# Patient Record
Sex: MALE | Race: WHITE | NOT HISPANIC OR LATINO | Employment: FULL TIME | URBAN - NONMETROPOLITAN AREA
[De-identification: names, ages, dates, MRNs, and addresses within clinical notes are randomized per-mention and may not be internally consistent; named-entity substitution may affect disease eponyms.]

---

## 2023-03-16 ENCOUNTER — OFFICE VISIT (OUTPATIENT)
Dept: INTERNAL MEDICINE | Facility: CLINIC | Age: 53
End: 2023-03-16
Payer: COMMERCIAL

## 2023-03-16 VITALS
HEART RATE: 96 BPM | RESPIRATION RATE: 18 BRPM | DIASTOLIC BLOOD PRESSURE: 75 MMHG | TEMPERATURE: 98 F | WEIGHT: 199.4 LBS | HEIGHT: 72 IN | BODY MASS INDEX: 27.01 KG/M2 | SYSTOLIC BLOOD PRESSURE: 138 MMHG | OXYGEN SATURATION: 97 %

## 2023-03-16 DIAGNOSIS — E11.649 TYPE 2 DIABETES MELLITUS WITH HYPOGLYCEMIA WITHOUT COMA, WITH LONG-TERM CURRENT USE OF INSULIN: Primary | ICD-10-CM

## 2023-03-16 DIAGNOSIS — R53.82 CHRONIC FATIGUE: ICD-10-CM

## 2023-03-16 DIAGNOSIS — Z00.00 ENCOUNTER FOR MEDICAL EXAMINATION TO ESTABLISH CARE: ICD-10-CM

## 2023-03-16 DIAGNOSIS — Z79.4 TYPE 2 DIABETES MELLITUS WITH HYPOGLYCEMIA WITHOUT COMA, WITH LONG-TERM CURRENT USE OF INSULIN: Primary | ICD-10-CM

## 2023-03-16 DIAGNOSIS — E53.8 VITAMIN B12 DEFICIENCY: ICD-10-CM

## 2023-03-16 DIAGNOSIS — Z11.59 ENCOUNTER FOR HEPATITIS C SCREENING TEST FOR LOW RISK PATIENT: ICD-10-CM

## 2023-03-16 DIAGNOSIS — E55.9 VITAMIN D DEFICIENCY: ICD-10-CM

## 2023-03-16 DIAGNOSIS — I10 HYPERTENSION, UNSPECIFIED TYPE: ICD-10-CM

## 2023-03-16 DIAGNOSIS — Z13.220 SCREENING FOR HYPERLIPIDEMIA: ICD-10-CM

## 2023-03-16 LAB
EXPIRATION DATE: ABNORMAL
HBA1C MFR BLD: 8 %
Lab: ABNORMAL

## 2023-03-16 PROCEDURE — 99204 OFFICE O/P NEW MOD 45 MIN: CPT

## 2023-03-16 PROCEDURE — 83036 HEMOGLOBIN GLYCOSYLATED A1C: CPT

## 2023-03-16 RX ORDER — PROCHLORPERAZINE 25 MG/1
1 SUPPOSITORY RECTAL DAILY
Qty: 6 EACH | Refills: 3 | Status: SHIPPED | OUTPATIENT
Start: 2023-03-16

## 2023-03-16 RX ORDER — INSULIN LISPRO 100 [IU]/ML
3 INJECTION, SOLUTION INTRAVENOUS; SUBCUTANEOUS 3 TIMES DAILY
COMMUNITY
End: 2023-03-16 | Stop reason: SDUPTHER

## 2023-03-16 RX ORDER — LISINOPRIL 20 MG/1
20 TABLET ORAL DAILY
COMMUNITY
End: 2023-03-16 | Stop reason: SDUPTHER

## 2023-03-16 RX ORDER — AMLODIPINE BESYLATE 2.5 MG/1
2.5 TABLET ORAL DAILY
COMMUNITY
End: 2023-03-16 | Stop reason: SDUPTHER

## 2023-03-16 RX ORDER — AMLODIPINE BESYLATE 2.5 MG/1
2.5 TABLET ORAL DAILY
Qty: 90 TABLET | Refills: 1 | Status: SHIPPED | OUTPATIENT
Start: 2023-03-16 | End: 2023-03-22 | Stop reason: SDUPTHER

## 2023-03-16 RX ORDER — PROCHLORPERAZINE 25 MG/1
SUPPOSITORY RECTAL
COMMUNITY
End: 2023-03-16 | Stop reason: SDUPTHER

## 2023-03-16 RX ORDER — INSULIN LISPRO 100 [IU]/ML
8 INJECTION, SOLUTION INTRAVENOUS; SUBCUTANEOUS 3 TIMES DAILY
Qty: 12 ML | Refills: 1 | Status: SHIPPED | OUTPATIENT
Start: 2023-03-16

## 2023-03-16 RX ORDER — BLOOD-GLUCOSE SENSOR
EACH MISCELLANEOUS
COMMUNITY
End: 2023-03-16 | Stop reason: SDUPTHER

## 2023-03-16 RX ORDER — LISINOPRIL 20 MG/1
20 TABLET ORAL DAILY
Qty: 90 TABLET | Refills: 1 | Status: SHIPPED | OUTPATIENT
Start: 2023-03-16 | End: 2023-03-22 | Stop reason: SDUPTHER

## 2023-03-16 RX ORDER — BLOOD SUGAR DIAGNOSTIC
STRIP MISCELLANEOUS
COMMUNITY

## 2023-03-16 RX ORDER — BLOOD-GLUCOSE SENSOR
1 EACH MISCELLANEOUS
Qty: 6 EACH | Refills: 3 | Status: SHIPPED | OUTPATIENT
Start: 2023-03-16 | End: 2023-03-22

## 2023-03-16 NOTE — PROGRESS NOTES
Subjective     Chief Complaint   Patient presents with   • Diabetes     Establishing care.        History of Present Illness  Patient is a 53-year-old male who presents today to establish care.  Has known past medical history of type 2 diabetes mellitus, hypertension, and arthritis. Has been diabetic for 17 years. Does forget to take fast acting insulin 30 minutes prior to meals.  Recently moved to this area, states had previously lived here and then moved off her work, and has not returned.  Is needing a primary care provider in this area.  He is currently on amlodipine 2.5 mg daily along with lisinopril 20 mg daily for hypertension management.  Is currently on metformin, glargine, and Humalog.  He does have a Dexcom in which he is able to monitor his glucose closely.  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     States he works 3rd shift at [employer] so he is just getting off work, so does not surprise him BP is slightly elevated. Admits BP normally runs 124/80's. Is due to take medications when he gets home.       Patient's PMR from outside medical facility reviewed and noted.    Review of Systems   Constitutional: Negative for activity change, chills, fatigue and unexpected weight change.   HENT: Negative for congestion, ear pain, mouth sores and trouble swallowing.    Eyes: Negative for discharge and visual disturbance.   Respiratory: Negative for cough and shortness of breath.    Cardiovascular: Negative for chest pain and leg swelling.   Gastrointestinal: Negative for abdominal pain, constipation, diarrhea and nausea.   Genitourinary: Negative for decreased urine volume, difficulty urinating and hematuria.   Musculoskeletal: Negative for back pain, gait problem and myalgias.   Skin: Negative for color change and rash.   Allergic/Immunologic: Negative for environmental allergies and immunocompromised state.   Neurological: Negative for speech  difficulty, weakness and headaches.   Psychiatric/Behavioral: Negative for confusion and sleep disturbance. The patient is not nervous/anxious.         Otherwise complete ROS reviewed and negative except as mentioned in the HPI.    Past Medical History:   Past Medical History:   Diagnosis Date   • Arthritis    • Diabetes mellitus (HCC)    • Hypertension      Past Surgical History:  Past Surgical History:   Procedure Laterality Date   • KIDNEY SURGERY Left      Social History:  reports that he has never smoked. He has never used smokeless tobacco. He reports current alcohol use. He reports that he does not use drugs.    Family History: family history includes Alzheimer's disease in his father; Diabetes in his mother.      Allergies:  Allergies   Allergen Reactions   • Penicillins GI Intolerance     Medications:  Prior to Admission medications    Medication Sig Start Date End Date Taking? Authorizing Provider   amLODIPine (NORVASC) 2.5 MG tablet Take 1 tablet by mouth Daily.   Yes Althea Tang MD   Continuous Blood Gluc Sensor (Dexcom G7 Sensor) misc    Yes Althea Tang MD   Continuous Blood Gluc Transmit (Dexcom G6 Transmitter) misc    Yes Althea Tang MD   Insulin Glargine, 1 Unit Dial, (TOUJEO) 300 UNIT/ML solution pen-injector injection Inject  under the skin into the appropriate area as directed.   Yes Althea Tang MD   Insulin Lispro, 1 Unit Dial, (HumaLOG KwikPen) 100 UNIT/ML solution pen-injector Inject 300 Units under the skin into the appropriate area as directed.   Yes Althea Tang MD   Insulin Pen Needle (Pen Needles) 32G X 6 MM misc    Yes Althea Tang MD   lisinopril (PRINIVIL,ZESTRIL) 20 MG tablet Take 1 tablet by mouth Daily.   Yes Althea Tang MD   metFORMIN (GLUCOPHAGE) 500 MG tablet Take 1 tablet by mouth 3 (Three) Times a Day.   Yes Althea Tang MD       EVELYNE:        PHQ-9 Depression Screening  Little interest or pleasure in  "doing things? 0-->not at all   Feeling down, depressed, or hopeless? 0-->not at all   Trouble falling or staying asleep, or sleeping too much?     Feeling tired or having little energy?     Poor appetite or overeating?     Feeling bad about yourself - or that you are a failure or have let yourself or your family down?     Trouble concentrating on things, such as reading the newspaper or watching television?     Moving or speaking so slowly that other people could have noticed? Or the opposite - being so fidgety or restless that you have been moving around a lot more than usual?     Thoughts that you would be better off dead, or of hurting yourself in some way?     PHQ-9 Total Score 0   If you checked off any problems, how difficult have these problems made it for you to do your work, take care of things at home, or get along with other people?         PHQ-9 Total Score: 0   0 (Negative screening for depression)  Support given, observe for worsening symptoms    Objective     Vital Signs: /75 (BP Location: Right arm, Patient Position: Sitting, Cuff Size: Adult)   Pulse 96   Temp 98 °F (36.7 °C) (Skin)   Resp 18   Ht 182.9 cm (72\")   Wt 90.4 kg (199 lb 6.4 oz)   SpO2 97%   BMI 27.04 kg/m²   Physical Exam  Vitals and nursing note reviewed.   Constitutional:       General: He is not in acute distress.     Appearance: Normal appearance. He is normal weight. He is not ill-appearing.   HENT:      Head: Normocephalic and atraumatic.      Right Ear: External ear normal.      Left Ear: External ear normal.      Nose: Nose normal.      Mouth/Throat:      Mouth: Mucous membranes are moist.      Pharynx: No posterior oropharyngeal erythema.   Eyes:      General: No scleral icterus.     Extraocular Movements: Extraocular movements intact.      Conjunctiva/sclera: Conjunctivae normal.      Pupils: Pupils are equal, round, and reactive to light.   Cardiovascular:      Rate and Rhythm: Normal rate and regular rhythm.     "  Pulses: Normal pulses.      Heart sounds: Normal heart sounds.   Pulmonary:      Effort: Pulmonary effort is normal. No respiratory distress.      Breath sounds: Normal breath sounds. No wheezing.   Abdominal:      General: Abdomen is flat. Bowel sounds are normal.      Palpations: Abdomen is soft.      Tenderness: There is no abdominal tenderness.   Musculoskeletal:         General: Normal range of motion.      Cervical back: Normal range of motion.      Right lower leg: No edema.      Left lower leg: Edema present.   Lymphadenopathy:      Cervical: No cervical adenopathy.   Skin:     General: Skin is warm and dry.      Findings: No erythema or rash.   Neurological:      General: No focal deficit present.      Mental Status: He is alert and oriented to person, place, and time. Mental status is at baseline.      Motor: No weakness.   Psychiatric:         Mood and Affect: Mood normal.         Behavior: Behavior normal.         Thought Content: Thought content normal.         Judgment: Judgment normal.         BMI is >= 25 and <30. (Overweight) The following options were offered after discussion;: exercise counseling/recommendations and nutrition counseling/recommendations      Advance Care Planning   ACP discussion was declined by the patient. Patient does not have an advance directive, declines further assistance.         Results Reviewed:  Hemoglobin A1C   Date Value Ref Range Status   03/16/2023 8.0 % Final         Assessment / Plan     Assessment/Plan:  1. Type 2 diabetes mellitus with hypoglycemia without coma, with long-term current use of insulin ([facility])  - POC Glycosylated Hemoglobin (Hb A1C)  - Continuous Blood Gluc Sensor (Dexcom G7 Sensor) misc; 1 each Every 10 (Ten) Days.  Dispense: 6 each; Refill: 3  - Insulin Lispro, 1 Unit Dial, (HumaLOG KwikPen) 100 UNIT/ML solution pen-injector; Inject 8 Units under the skin into the appropriate area as directed 3 (Three) Times a Day. Patient utilizes sliding scale to  determine insulin administration  Dispense: 12 mL; Refill: 1  - metFORMIN (GLUCOPHAGE) 500 MG tablet; Take 1 tablet by mouth 3 (Three) Times a Day.  Dispense: 90 tablet; Refill: 3  - Continuous Blood Gluc Transmit (Dexcom G6 Transmitter) misc; 1 each Daily.  Dispense: 6 each; Refill: 3  - Insulin Glargine, 1 Unit Dial, (TOUJEO) 300 UNIT/ML solution pen-injector injection; Inject 49 Units under the skin into the appropriate area as directed Daily. 49-50 units once a day.  Dispense: 6 mL; Refill: 3    2. Hypertension, unspecified type  - lisinopril (PRINIVIL,ZESTRIL) 20 MG tablet; Take 1 tablet by mouth Daily.  Dispense: 90 tablet; Refill: 1  - amLODIPine (NORVASC) 2.5 MG tablet; Take 1 tablet by mouth Daily.  Dispense: 90 tablet; Refill: 1  - CBC w AUTO Differential  - Comprehensive metabolic panel    3. Encounter for medical examination to establish care  - CBC w AUTO Differential  - Comprehensive metabolic panel  - Lipid panel  - Vitamin D 25 hydroxy  - Vitamin B12  - T4  - TSH    4. Vitamin B12 deficiency  - Vitamin B12    5. Vitamin D deficiency  - Vitamin D 25 hydroxy    6. Chronic fatigue  - T4  - TSH    7. Screening for hyperlipidemia  - Lipid panel    8. Screening for hepatitis c   -hepatitis c antibody      A1c is 8 today. He states that is slightly higher than what he normally runs. Admits that he has been for    States last colonoscopy was approximately 1.5 years ago, states all was normal.     No follow-ups on file. unless patient needs to be seen sooner or acute issues arise.      I have discussed the patient results/orders and and plan/recommendation with them at today's visit.      July Johnson, APRN   03/16/2023

## 2023-03-17 LAB
25(OH)D3+25(OH)D2 SERPL-MCNC: 18.6 NG/ML (ref 30–100)
ALBUMIN SERPL-MCNC: 3.9 G/DL (ref 3.8–4.9)
ALBUMIN/GLOB SERPL: 1.8 {RATIO} (ref 1.2–2.2)
ALP SERPL-CCNC: 91 IU/L (ref 44–121)
ALT SERPL-CCNC: 28 IU/L (ref 0–44)
AST SERPL-CCNC: 20 IU/L (ref 0–40)
BASOPHILS # BLD AUTO: 0.1 X10E3/UL (ref 0–0.2)
BASOPHILS NFR BLD AUTO: 1 %
BILIRUB SERPL-MCNC: 0.3 MG/DL (ref 0–1.2)
BUN SERPL-MCNC: 21 MG/DL (ref 6–24)
BUN/CREAT SERPL: 22 (ref 9–20)
CALCIUM SERPL-MCNC: 8.7 MG/DL (ref 8.7–10.2)
CHLORIDE SERPL-SCNC: 101 MMOL/L (ref 96–106)
CHOLEST SERPL-MCNC: 158 MG/DL (ref 100–199)
CO2 SERPL-SCNC: 20 MMOL/L (ref 20–29)
CREAT SERPL-MCNC: 0.95 MG/DL (ref 0.76–1.27)
EGFRCR SERPLBLD CKD-EPI 2021: 96 ML/MIN/1.73
EOSINOPHIL # BLD AUTO: 0.3 X10E3/UL (ref 0–0.4)
EOSINOPHIL NFR BLD AUTO: 4 %
ERYTHROCYTE [DISTWIDTH] IN BLOOD BY AUTOMATED COUNT: 12.1 % (ref 11.6–15.4)
GLOBULIN SER CALC-MCNC: 2.2 G/DL (ref 1.5–4.5)
GLUCOSE SERPL-MCNC: 220 MG/DL (ref 70–99)
HCT VFR BLD AUTO: 42.4 % (ref 37.5–51)
HCV IGG SERPL QL IA: NON REACTIVE
HDLC SERPL-MCNC: 37 MG/DL
HGB BLD-MCNC: 14.2 G/DL (ref 13–17.7)
IMM GRANULOCYTES # BLD AUTO: 0.1 X10E3/UL (ref 0–0.1)
IMM GRANULOCYTES NFR BLD AUTO: 1 %
LDLC SERPL CALC-MCNC: 86 MG/DL (ref 0–99)
LYMPHOCYTES # BLD AUTO: 2 X10E3/UL (ref 0.7–3.1)
LYMPHOCYTES NFR BLD AUTO: 24 %
MCH RBC QN AUTO: 30.5 PG (ref 26.6–33)
MCHC RBC AUTO-ENTMCNC: 33.5 G/DL (ref 31.5–35.7)
MCV RBC AUTO: 91 FL (ref 79–97)
MONOCYTES # BLD AUTO: 0.6 X10E3/UL (ref 0.1–0.9)
MONOCYTES NFR BLD AUTO: 8 %
NEUTROPHILS # BLD AUTO: 5.2 X10E3/UL (ref 1.4–7)
NEUTROPHILS NFR BLD AUTO: 62 %
PLATELET # BLD AUTO: 381 X10E3/UL (ref 150–450)
POTASSIUM SERPL-SCNC: 5 MMOL/L (ref 3.5–5.2)
PROT SERPL-MCNC: 6.1 G/DL (ref 6–8.5)
RBC # BLD AUTO: 4.66 X10E6/UL (ref 4.14–5.8)
SODIUM SERPL-SCNC: 137 MMOL/L (ref 134–144)
T4 SERPL-MCNC: 7.3 UG/DL (ref 4.5–12)
TRIGL SERPL-MCNC: 203 MG/DL (ref 0–149)
TSH SERPL DL<=0.005 MIU/L-ACNC: 3.43 UIU/ML (ref 0.45–4.5)
VIT B12 SERPL-MCNC: 544 PG/ML (ref 232–1245)
VLDLC SERPL CALC-MCNC: 35 MG/DL (ref 5–40)
WBC # BLD AUTO: 8.3 X10E3/UL (ref 3.4–10.8)

## 2023-03-21 ENCOUNTER — TELEPHONE (OUTPATIENT)
Dept: INTERNAL MEDICINE | Facility: CLINIC | Age: 53
End: 2023-03-21
Payer: COMMERCIAL

## 2023-03-21 DIAGNOSIS — E55.9 VITAMIN D DEFICIENCY: Primary | ICD-10-CM

## 2023-03-21 DIAGNOSIS — E78.1 HIGH TRIGLYCERIDES: ICD-10-CM

## 2023-03-21 RX ORDER — CHLORAL HYDRATE 500 MG
2000 CAPSULE ORAL
Qty: 120 CAPSULE | Refills: 1 | Status: SHIPPED | OUTPATIENT
Start: 2023-03-21

## 2023-03-21 RX ORDER — ERGOCALCIFEROL 1.25 MG/1
50000 CAPSULE ORAL WEEKLY
Qty: 5 CAPSULE | Refills: 3 | Status: SHIPPED | OUTPATIENT
Start: 2023-03-21

## 2023-03-21 NOTE — TELEPHONE ENCOUNTER
Tempted to call patient discussed her labs, no answer left voicemail for him to return phone call to office.

## 2023-03-21 NOTE — PROGRESS NOTES
I attempted to call patient however was unsuccessful I did leave a voicemail for him to return phone call.Please let him know that his triglycerides are slightly elevated at 203, I would like him to decrease amount of saturated fat in his diet and look to choose leaner meats such as chicken and turkey.  I would also like him to start taking 2000 mg of omega-3 fish oil daily, have sent prescription to pharmacy.  His vitamin D is also low I will send replacement pill to the pharmacy if he is okay with that.

## 2023-03-22 ENCOUNTER — TELEPHONE (OUTPATIENT)
Dept: INTERNAL MEDICINE | Facility: CLINIC | Age: 53
End: 2023-03-22
Payer: COMMERCIAL

## 2023-03-22 DIAGNOSIS — I10 HYPERTENSION, UNSPECIFIED TYPE: ICD-10-CM

## 2023-03-22 RX ORDER — PROCHLORPERAZINE 25 MG/1
SUPPOSITORY RECTAL
Qty: 9 EACH | Refills: 3 | Status: SHIPPED | OUTPATIENT
Start: 2023-03-22

## 2023-03-22 RX ORDER — AMLODIPINE BESYLATE 2.5 MG/1
2.5 TABLET ORAL DAILY
Qty: 90 TABLET | Refills: 1 | Status: SHIPPED | OUTPATIENT
Start: 2023-03-22

## 2023-03-22 RX ORDER — LISINOPRIL 20 MG/1
20 TABLET ORAL DAILY
Qty: 90 TABLET | Refills: 1 | Status: SHIPPED | OUTPATIENT
Start: 2023-03-22

## 2023-03-22 NOTE — TELEPHONE ENCOUNTER
PATIENT CALLING BACK IN TO LET EDIE KNOW HE HAS A DEXCOM G 6 NOT G 7 PATIENT STATES HE SEEN THE MISTAKE IN THE ENCOUNTER AND WANTED TO CALL AND LET HER KNOW  IT IS THE G 6     GOOD CALL BACK   [phone]

## 2023-03-22 NOTE — TELEPHONE ENCOUNTER
Rx Refill Note  Requested Prescriptions     Pending Prescriptions Disp Refills   • lisinopril (PRINIVIL,ZESTRIL) 20 MG tablet 90 tablet 1     Sig: Take 1 tablet by mouth Daily.   • amLODIPine (NORVASC) 2.5 MG tablet 90 tablet 1     Sig: Take 1 tablet by mouth Daily.      Last office visit with prescribing clinician: 3/16/2023   Last telemedicine visit with prescribing clinician: 6/16/2023   Next office visit with prescribing clinician: 6/16/2023                         Would you like a call back once the refill request has been completed: [] Yes [] No    If the office needs to give you a call back, can they leave a voicemail: [] Yes [] No    Samia No MA  03/22/23, 09:28 CDT

## 2023-03-22 NOTE — TELEPHONE ENCOUNTER
Rx Refill Note  Requested Prescriptions     Pending Prescriptions Disp Refills   • Continuous Blood Gluc Sensor (Dexcom G6 Sensor) 9 each 3     Sig: Every 10 (Ten) Days.      Last office visit with prescribing clinician: 3/16/2023   Last telemedicine visit with prescribing clinician: 6/16/2023   Next office visit with prescribing clinician: 6/16/2023                         Would you like a call back once the refill request has been completed: [] Yes [] No    If the office needs to give you a call back, can they leave a voicemail: [] Yes [] No    Samia No MA  03/22/23, 15:59 CDT

## 2023-04-07 RX ORDER — SILDENAFIL CITRATE 20 MG/1
TABLET ORAL
COMMUNITY
Start: 2021-12-15 | End: 2023-04-07 | Stop reason: SDUPTHER

## 2023-04-07 RX ORDER — SILDENAFIL CITRATE 20 MG/1
20 TABLET ORAL DAILY
Qty: 30 TABLET | Refills: 0 | Status: SHIPPED | OUTPATIENT
Start: 2023-04-07

## 2023-04-07 RX ORDER — TRIAMCINOLONE ACETONIDE 1 MG/G
CREAM TOPICAL
COMMUNITY
Start: 2020-11-10

## 2023-04-07 NOTE — TELEPHONE ENCOUNTER
THIS WAS NOT ON PATIENT MED LIST, BUT HE STATED HE NEEDED A REFILL. ARE YOU OKAY WITH THIS?     Rx Refill Note  Requested Prescriptions     Pending Prescriptions Disp Refills   • sildenafil (REVATIO) 20 MG tablet        Last office visit with prescribing clinician: 3/16/2023  Next office visit with prescribing clinician: 6/16/2023                         Would you like a call back once the refill request has been completed: [] Yes [] No    If the office needs to give you a call back, can they leave a voicemail: [] Yes [] No    Kelley De La Cruz RN  04/07/23, 07:06 CDT

## 2023-04-07 NOTE — TELEPHONE ENCOUNTER
----- Message from Aydin Bonilla sent at 4/7/2023  7:02 AM CDT -----  Regarding: Two Medications   Contact: [phone]  When I had my first appointment I forgot a couple of prescription. I have added them to my list of medications in [facility] and I am needing a refill for the Sildenafil Citrate 20 MG. tablet.  Could you send a prescription to [facility] for a refill?    Thanks,  Aydin

## 2023-06-16 ENCOUNTER — OFFICE VISIT (OUTPATIENT)
Dept: INTERNAL MEDICINE | Facility: CLINIC | Age: 53
End: 2023-06-16
Payer: COMMERCIAL

## 2023-06-16 VITALS
HEART RATE: 80 BPM | WEIGHT: 197.3 LBS | DIASTOLIC BLOOD PRESSURE: 68 MMHG | RESPIRATION RATE: 17 BRPM | SYSTOLIC BLOOD PRESSURE: 130 MMHG | OXYGEN SATURATION: 97 % | HEIGHT: 72 IN | BODY MASS INDEX: 26.72 KG/M2 | TEMPERATURE: 97.7 F

## 2023-06-16 DIAGNOSIS — Z23 IMMUNIZATION DUE: ICD-10-CM

## 2023-06-16 DIAGNOSIS — E11.649 TYPE 2 DIABETES MELLITUS WITH HYPOGLYCEMIA WITHOUT COMA, WITH LONG-TERM CURRENT USE OF INSULIN: Primary | ICD-10-CM

## 2023-06-16 DIAGNOSIS — E55.9 VITAMIN D DEFICIENCY: ICD-10-CM

## 2023-06-16 DIAGNOSIS — E78.1 HIGH TRIGLYCERIDES: ICD-10-CM

## 2023-06-16 DIAGNOSIS — I10 HYPERTENSION, UNSPECIFIED TYPE: ICD-10-CM

## 2023-06-16 DIAGNOSIS — Z79.4 TYPE 2 DIABETES MELLITUS WITH HYPOGLYCEMIA WITHOUT COMA, WITH LONG-TERM CURRENT USE OF INSULIN: Primary | ICD-10-CM

## 2023-06-16 LAB
EXPIRATION DATE: ABNORMAL
HBA1C MFR BLD: 7.6 %
Lab: ABNORMAL

## 2023-06-16 RX ORDER — INSULIN LISPRO 100 [IU]/ML
8 INJECTION, SOLUTION INTRAVENOUS; SUBCUTANEOUS 3 TIMES DAILY
Qty: 12 ML | Refills: 1 | Status: SHIPPED | OUTPATIENT
Start: 2023-06-16

## 2023-06-16 RX ORDER — ERGOCALCIFEROL 1.25 MG/1
50000 CAPSULE ORAL WEEKLY
Qty: 5 CAPSULE | Refills: 3 | Status: SHIPPED | OUTPATIENT
Start: 2023-06-16

## 2023-06-16 RX ORDER — OMEPRAZOLE 20 MG/1
20 CAPSULE, DELAYED RELEASE ORAL DAILY
Qty: 60 CAPSULE | Refills: 0 | Status: SHIPPED | OUTPATIENT
Start: 2023-06-16

## 2023-06-16 RX ORDER — CHLORAL HYDRATE 500 MG
2000 CAPSULE ORAL
Qty: 120 CAPSULE | Refills: 1 | Status: SHIPPED | OUTPATIENT
Start: 2023-06-16

## 2023-06-16 NOTE — PROGRESS NOTES
"        Subjective     Chief Complaint   Patient presents with    Diabetes     Follow up.        History of Present Illness  Patient presents today for T2DM follow up. He is currently on insulin therapy. Takes fast acting insulin along with short acting (via sliding scale) glargine 50 units daily. He admits that he usually will administer 8-9 units if short acting with meals. Tolerating well. Also takes metformin 1500mg daily. Has a dexcom. Glucose runs around \"200\" or \"a little higher.\"  Patient has known history of hypertension with chronic management with norvasc 2.5mg daily and lisinopril 20mg daily. Tolerating medications well. Does not check BP at home.   Patient's PMR from outside medical facility reviewed and noted.    Review of Systems   Constitutional:  Negative for activity change, fatigue and unexpected weight change.   HENT:  Negative for mouth sores and trouble swallowing.    Eyes:  Negative for discharge and visual disturbance.   Respiratory:  Negative for cough and shortness of breath.    Cardiovascular:  Negative for chest pain and leg swelling.   Gastrointestinal:  Negative for abdominal pain, constipation, diarrhea and nausea.   Genitourinary:  Negative for decreased urine volume, difficulty urinating and hematuria.   Musculoskeletal:  Negative for back pain and gait problem.   Skin:  Negative for color change and rash.   Allergic/Immunologic: Negative for environmental allergies and immunocompromised state.   Neurological:  Negative for weakness and headaches.   Psychiatric/Behavioral:  Negative for confusion and sleep disturbance.       Otherwise complete ROS reviewed and negative except as mentioned in the HPI.    Past Medical History:   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    Diabetes mellitus     Erectile dysfunction 2007    Hypertension      Past Surgical History:  Past Surgical History:   Procedure Laterality Date    KIDNEY " SURGERY Left      Social History:  reports that he has never smoked. He has never used smokeless tobacco. He reports current alcohol use. He reports that he does not use drugs.    Family History: family history includes Alzheimer's disease in his father; Anxiety disorder in his mother; Depression in his mother; Diabetes in his mother, sister, and sister; Hyperlipidemia in his mother; Hypertension in his mother.      Allergies:  Allergies   Allergen Reactions    Penicillins GI Intolerance     Medications:  Prior to Admission medications    Medication Sig Start Date End Date Taking? Authorizing Provider   amLODIPine (NORVASC) 2.5 MG tablet Take 1 tablet by mouth Daily. 3/22/23  Yes July Johnson APRN   Continuous Blood Gluc Sensor (Dexcom G6 Sensor) Every 10 (Ten) Days. 3/22/23  Yes July Johnson APRN   Continuous Blood Gluc Transmit (Dexcom G6 Transmitter) misc 1 each Daily. 3/16/23  Yes July Johnson APRN   Insulin Glargine, 1 Unit Dial, (TOUJEO) 300 UNIT/ML solution pen-injector injection Inject 49 Units under the skin into the appropriate area as directed Daily. 49-50 units once a day. 3/16/23  Yes July Johnson APRN   Insulin Lispro, 1 Unit Dial, (HumaLOG KwikPen) 100 UNIT/ML solution pen-injector Inject 8 Units under the skin into the appropriate area as directed 3 (Three) Times a Day. Patient utilizes sliding scale to determine insulin administration 3/16/23  Yes July Johnson APRN   Insulin Pen Needle (Pen Needles) 32G X 6 MM misc    Yes Provider, MD Althea   lisinopril (PRINIVIL,ZESTRIL) 20 MG tablet Take 1 tablet by mouth Daily. 3/22/23  Yes July Johnson APRN   metFORMIN (GLUCOPHAGE) 500 MG tablet Take 1 tablet by mouth 3 (Three) Times a Day. 3/16/23  Yes July Johnson APRN   Omega-3 Fatty Acids (fish oil) 1000 MG capsule capsule Take 2 capsules by mouth Daily With Breakfast. 3/21/23  Yes July Johnson APRN   sildenafil (REVATIO) 20 MG tablet Take 1 tablet by mouth  "Daily. 4/7/23  Yes July Johnson APRN   triamcinolone (KENALOG) 0.1 % cream  11/10/20  Yes Provider, MD Althea   vitamin D (ERGOCALCIFEROL) 1.25 MG (24852 UT) capsule capsule Take 1 capsule by mouth 1 (One) Time Per Week. 3/21/23  Yes July Johnson APRN         Objective     Vital Signs: /68 (BP Location: Right arm, Patient Position: Sitting, Cuff Size: Adult)   Pulse 80   Temp 97.7 °F (36.5 °C) (Skin)   Resp 17   Ht 182.9 cm (72\")   Wt 89.5 kg (197 lb 4.8 oz)   SpO2 97%   BMI 26.76 kg/m²   Physical Exam  Vitals and nursing note reviewed.   Constitutional:       General: He is not in acute distress.     Appearance: Normal appearance. He is not ill-appearing.   HENT:      Head: Normocephalic and atraumatic.      Right Ear: External ear normal.      Left Ear: External ear normal.      Nose: Nose normal.      Mouth/Throat:      Mouth: Mucous membranes are moist.      Pharynx: No posterior oropharyngeal erythema.   Eyes:      General: No scleral icterus.     Extraocular Movements: Extraocular movements intact.      Conjunctiva/sclera: Conjunctivae normal.      Pupils: Pupils are equal, round, and reactive to light.   Cardiovascular:      Rate and Rhythm: Normal rate and regular rhythm.      Pulses: Normal pulses.      Heart sounds: Normal heart sounds.   Pulmonary:      Effort: Pulmonary effort is normal. No respiratory distress.      Breath sounds: Normal breath sounds. No wheezing.   Abdominal:      General: Abdomen is flat. Bowel sounds are normal.      Palpations: Abdomen is soft.      Tenderness: There is no abdominal tenderness.   Musculoskeletal:         General: Normal range of motion.      Cervical back: Normal range of motion.      Right lower leg: No edema.      Left lower leg: No edema.   Feet:      Right foot:      Protective Sensation: 10 sites tested.  10 sites sensed.      Skin integrity: Skin integrity normal.      Left foot:      Protective Sensation: 10 sites tested.  10 " sites sensed.      Skin integrity: Skin integrity normal.   Skin:     General: Skin is warm and dry.      Findings: No erythema or rash.   Neurological:      General: No focal deficit present.      Mental Status: He is alert and oriented to person, place, and time. Mental status is at baseline.      Motor: No weakness.   Psychiatric:         Mood and Affect: Mood normal.         Behavior: Behavior normal.         Thought Content: Thought content normal.         Judgment: Judgment normal.       Results Reviewed:  Glucose   Date Value Ref Range Status   03/16/2023 220 (H) 70 - 99 mg/dL Final     BUN   Date Value Ref Range Status   03/16/2023 21 6 - 24 mg/dL Final     Creatinine   Date Value Ref Range Status   03/16/2023 0.95 0.76 - 1.27 mg/dL Final     Sodium   Date Value Ref Range Status   03/16/2023 137 134 - 144 mmol/L Final     Potassium   Date Value Ref Range Status   03/16/2023 5.0 3.5 - 5.2 mmol/L Final     Chloride   Date Value Ref Range Status   03/16/2023 101 96 - 106 mmol/L Final     Total CO2   Date Value Ref Range Status   03/16/2023 20 20 - 29 mmol/L Final     Calcium   Date Value Ref Range Status   03/16/2023 8.7 8.7 - 10.2 mg/dL Final     ALT (SGPT)   Date Value Ref Range Status   03/16/2023 28 0 - 44 IU/L Final     AST (SGOT)   Date Value Ref Range Status   03/16/2023 20 0 - 40 IU/L Final     WBC   Date Value Ref Range Status   03/16/2023 8.3 3.4 - 10.8 x10E3/uL Final     Hematocrit   Date Value Ref Range Status   03/16/2023 42.4 37.5 - 51.0 % Final     Platelets   Date Value Ref Range Status   03/16/2023 381 150 - 450 x10E3/uL Final     Triglycerides   Date Value Ref Range Status   03/16/2023 203 (H) 0 - 149 mg/dL Final     HDL Cholesterol   Date Value Ref Range Status   03/16/2023 37 (L) >39 mg/dL Final     LDL Chol Calc (NIH)   Date Value Ref Range Status   03/16/2023 86 0 - 99 mg/dL Final     Hemoglobin A1C   Date Value Ref Range Status   06/16/2023 7.6 % Final         Assessment / Plan      Assessment/Plan:  1. Type 2 diabetes mellitus with hypoglycemia without coma, with long-term current use of insulin  - MicroAlbumin, Urine, Random - Urine, Clean Catch  - POC Glycosylated Hemoglobin (Hb A1C)  - Insulin Glargine, 1 Unit Dial, (TOUJEO) 300 UNIT/ML solution pen-injector injection; Inject 50 Units under the skin into the appropriate area as directed Daily. 49-50 units once a day.  Dispense: 6 mL; Refill: 3  - metFORMIN (GLUCOPHAGE) 1000 MG tablet; Take 1 tablet by mouth 2 (Two) Times a Day With Meals.  Dispense: 120 tablet; Refill: 3  - Insulin Lispro, 1 Unit Dial, (HumaLOG KwikPen) 100 UNIT/ML solution pen-injector; Inject 8 Units under the skin into the appropriate area as directed 3 (Three) Times a Day. Patient utilizes sliding scale to determine insulin administration  Dispense: 12 mL; Refill: 1  -A1c has decreased since previous visit. Will increase metformin from 1500 mg daily to 2000 mg daily.     2. Hypertension, unspecified type  - CBC w AUTO Differential  - Comprehensive metabolic panel  -BP slightly elevated at 130/68 will continue to monitor     3. High triglycerides  - Lipid panel  - Omega-3 Fatty Acids (fish oil) 1000 MG capsule capsule; Take 2 capsules by mouth Daily With Breakfast.  Dispense: 120 capsule; Refill: 1    4. Immunization due  - Pneumococcal Conjugate Vaccine 20-Valent (PCV20)    5. Vitamin D deficiency  - vitamin D (ERGOCALCIFEROL) 1.25 MG (78639 UT) capsule capsule; Take 1 capsule by mouth 1 (One) Time Per Week.  Dispense: 5 capsule; Refill: 3        Return for Annual physical. unless patient needs to be seen sooner or acute issues arise.      I have discussed the patient results/orders and and plan/recommendation with them at today's visit.      RADHA Mccann   06/16/2023      Answers submitted by the patient for this visit:  Primary Reason for Visit (Submitted on 6/11/2023)  What is the primary reason for your visit?: Other  Other (Submitted on  6/11/2023)  Please describe your symptoms.: 6 month diabetic check  Have you had these symptoms before?: Yes  How long have you been having these symptoms?: Greater than 2 weeks

## 2023-06-17 LAB
ALBUMIN SERPL-MCNC: 4 G/DL (ref 3.8–4.9)
ALBUMIN/GLOB SERPL: 1.9 {RATIO} (ref 1.2–2.2)
ALP SERPL-CCNC: 93 IU/L (ref 44–121)
ALT SERPL-CCNC: 27 IU/L (ref 0–44)
AST SERPL-CCNC: 20 IU/L (ref 0–40)
BASOPHILS # BLD AUTO: 0.1 X10E3/UL (ref 0–0.2)
BASOPHILS NFR BLD AUTO: 1 %
BILIRUB SERPL-MCNC: 0.4 MG/DL (ref 0–1.2)
BUN SERPL-MCNC: 19 MG/DL (ref 6–24)
BUN/CREAT SERPL: 19 (ref 9–20)
CALCIUM SERPL-MCNC: 9.4 MG/DL (ref 8.7–10.2)
CHLORIDE SERPL-SCNC: 103 MMOL/L (ref 96–106)
CHOLEST SERPL-MCNC: 158 MG/DL (ref 100–199)
CO2 SERPL-SCNC: 22 MMOL/L (ref 20–29)
CREAT SERPL-MCNC: 0.98 MG/DL (ref 0.76–1.27)
EGFRCR SERPLBLD CKD-EPI 2021: 92 ML/MIN/1.73
EOSINOPHIL # BLD AUTO: 0.4 X10E3/UL (ref 0–0.4)
EOSINOPHIL NFR BLD AUTO: 6 %
ERYTHROCYTE [DISTWIDTH] IN BLOOD BY AUTOMATED COUNT: 11.8 % (ref 11.6–15.4)
GLOBULIN SER CALC-MCNC: 2.1 G/DL (ref 1.5–4.5)
GLUCOSE SERPL-MCNC: 213 MG/DL (ref 70–99)
HCT VFR BLD AUTO: 45.4 % (ref 37.5–51)
HDLC SERPL-MCNC: 45 MG/DL
HGB BLD-MCNC: 15.7 G/DL (ref 13–17.7)
IMM GRANULOCYTES # BLD AUTO: 0 X10E3/UL (ref 0–0.1)
IMM GRANULOCYTES NFR BLD AUTO: 0 %
LDLC SERPL CALC-MCNC: 96 MG/DL (ref 0–99)
LYMPHOCYTES # BLD AUTO: 1.9 X10E3/UL (ref 0.7–3.1)
LYMPHOCYTES NFR BLD AUTO: 31 %
MCH RBC QN AUTO: 30.7 PG (ref 26.6–33)
MCHC RBC AUTO-ENTMCNC: 34.6 G/DL (ref 31.5–35.7)
MCV RBC AUTO: 89 FL (ref 79–97)
MICROALBUMIN UR-MCNC: <3 UG/ML
MONOCYTES # BLD AUTO: 0.5 X10E3/UL (ref 0.1–0.9)
MONOCYTES NFR BLD AUTO: 8 %
NEUTROPHILS # BLD AUTO: 3.2 X10E3/UL (ref 1.4–7)
NEUTROPHILS NFR BLD AUTO: 54 %
PLATELET # BLD AUTO: 312 X10E3/UL (ref 150–450)
POTASSIUM SERPL-SCNC: 5 MMOL/L (ref 3.5–5.2)
PROT SERPL-MCNC: 6.1 G/DL (ref 6–8.5)
RBC # BLD AUTO: 5.12 X10E6/UL (ref 4.14–5.8)
SODIUM SERPL-SCNC: 139 MMOL/L (ref 134–144)
TRIGL SERPL-MCNC: 90 MG/DL (ref 0–149)
VLDLC SERPL CALC-MCNC: 17 MG/DL (ref 5–40)
WBC # BLD AUTO: 6.1 X10E3/UL (ref 3.4–10.8)

## 2023-09-07 DIAGNOSIS — Z79.4 TYPE 2 DIABETES MELLITUS WITH HYPOGLYCEMIA WITHOUT COMA, WITH LONG-TERM CURRENT USE OF INSULIN: ICD-10-CM

## 2023-09-07 DIAGNOSIS — E11.649 TYPE 2 DIABETES MELLITUS WITH HYPOGLYCEMIA WITHOUT COMA, WITH LONG-TERM CURRENT USE OF INSULIN: ICD-10-CM

## 2023-09-07 NOTE — TELEPHONE ENCOUNTER
Rx Refill Note  Requested Prescriptions     Pending Prescriptions Disp Refills    Insulin Glargine, 1 Unit Dial, (TOUJEO) 300 UNIT/ML solution pen-injector injection 6 mL 3     Sig: Inject 50 Units under the skin into the appropriate area as directed Daily. 49-50 units once a day.      Last office visit with prescribing clinician: 6/16/2023   Last telemedicine visit with prescribing clinician: Visit date not found   Next office visit with prescribing clinician: 9/8/2023                         Would you like a call back once the refill request has been completed: [] Yes [] No    If the office needs to give you a call back, can they leave a voicemail: [] Yes [] No    Samia No MA  09/07/23, 17:15 CDT

## 2023-09-08 ENCOUNTER — OFFICE VISIT (OUTPATIENT)
Dept: INTERNAL MEDICINE | Facility: CLINIC | Age: 53
End: 2023-09-08
Payer: COMMERCIAL

## 2023-09-08 VITALS
WEIGHT: 194.4 LBS | OXYGEN SATURATION: 97 % | HEART RATE: 71 BPM | DIASTOLIC BLOOD PRESSURE: 71 MMHG | TEMPERATURE: 97.3 F | HEIGHT: 72 IN | SYSTOLIC BLOOD PRESSURE: 125 MMHG | BODY MASS INDEX: 26.33 KG/M2 | RESPIRATION RATE: 18 BRPM

## 2023-09-08 DIAGNOSIS — I10 HYPERTENSION, UNSPECIFIED TYPE: ICD-10-CM

## 2023-09-08 DIAGNOSIS — E55.9 VITAMIN D DEFICIENCY: ICD-10-CM

## 2023-09-08 DIAGNOSIS — Z12.5 SCREENING FOR MALIGNANT NEOPLASM OF PROSTATE: ICD-10-CM

## 2023-09-08 DIAGNOSIS — Z79.4 TYPE 2 DIABETES MELLITUS WITH HYPOGLYCEMIA WITHOUT COMA, WITH LONG-TERM CURRENT USE OF INSULIN: Primary | ICD-10-CM

## 2023-09-08 DIAGNOSIS — E11.649 TYPE 2 DIABETES MELLITUS WITH HYPOGLYCEMIA WITHOUT COMA, WITH LONG-TERM CURRENT USE OF INSULIN: Primary | ICD-10-CM

## 2023-09-08 DIAGNOSIS — Z79.4 TYPE 2 DIABETES MELLITUS WITH HYPOGLYCEMIA WITHOUT COMA, WITH LONG-TERM CURRENT USE OF INSULIN: ICD-10-CM

## 2023-09-08 DIAGNOSIS — E78.1 HIGH TRIGLYCERIDES: ICD-10-CM

## 2023-09-08 DIAGNOSIS — E11.649 TYPE 2 DIABETES MELLITUS WITH HYPOGLYCEMIA WITHOUT COMA, WITH LONG-TERM CURRENT USE OF INSULIN: ICD-10-CM

## 2023-09-08 DIAGNOSIS — Z00.00 ROUTINE GENERAL MEDICAL EXAMINATION AT A HEALTH CARE FACILITY: ICD-10-CM

## 2023-09-08 RX ORDER — ERGOCALCIFEROL 1.25 MG/1
50000 CAPSULE ORAL WEEKLY
Qty: 10 CAPSULE | Refills: 3 | Status: SHIPPED | OUTPATIENT
Start: 2023-09-08

## 2023-09-08 NOTE — PROGRESS NOTES
"        Subjective     Chief Complaint   Patient presents with    Annual Exam       History of Present Illness  Patient presents today for annual physical. Is has known PMH of T2DM, currently on long acting and meal time insulin. Admits utilizes a \"sliding scale\" usually give 5-6 units at breakfast and anywhere between 8-9 units for lunch and dinner. Has been working on gaining better control of his diet. Is currently on lisinopril 20mg daily and Norvasc for hypertension management. Controlled on current medication.  He admits he has difficulty remembering to take his omega 3 fish oil, but has been working to improve compliance,   Has dexcom, admits glucose averages 180's, which is improved from the 200's he states was previously running.   No interest in shingles vaccination.     Admits had colonoscopy that was reportedly negative 3 years ago, he will obtain previous record and have sent to office.   Patient's PMR from outside medical facility reviewed and noted.    Review of Systems   Constitutional:  Negative for activity change, fatigue and unexpected weight change.   HENT:  Negative for mouth sores and trouble swallowing.    Eyes:  Negative for discharge and visual disturbance.   Respiratory:  Negative for cough and shortness of breath.    Cardiovascular:  Negative for chest pain and leg swelling.   Gastrointestinal:  Negative for abdominal pain, constipation, diarrhea and nausea.   Genitourinary:  Negative for decreased urine volume, difficulty urinating and hematuria.   Musculoskeletal:  Negative for back pain and gait problem.   Skin:  Negative for color change and rash.   Allergic/Immunologic: Negative for environmental allergies and immunocompromised state.   Neurological:  Negative for weakness and headaches.   Psychiatric/Behavioral:  Negative for confusion and sleep disturbance.       Otherwise complete ROS reviewed and negative except as mentioned in the HPI.    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 He has never used smokeless tobacco. He reports current alcohol use. He reports that he does not use drugs.    Family History: family history includes Alzheimer's disease in his father; Anxiety disorder in his mother; Depression in his mother; Diabetes in his mother, sister, and sister; Hyperlipidemia in his mother; Hypertension in his mother.      Allergies:  Allergies   Allergen Reactions    Penicillins GI Intolerance     Medications:  Prior to Admission medications    Medication Sig Start Date End Date Taking? Authorizing Provider   amLODIPine (NORVASC) 2.5 MG tablet Take 1 tablet by mouth Daily. 3/22/23  Yes July Johnson APRN   Continuous Blood Gluc Sensor (Dexcom G6 Sensor) Every 10 (Ten) Days. 3/22/23  Yes July Johnson APRN   Continuous Blood Gluc Transmit (Dexcom G6 Transmitter) misc 1 each Daily. 3/16/23  Yes July Johnson APRN   Insulin Glargine, 1 Unit Dial, (TOUJEO) 300 UNIT/ML solution pen-injector injection Inject 50 Units under the skin into the appropriate area as directed Daily. 49-50 units once a day. 9/8/23  Yes July Johnson APRN   Insulin Lispro, 1 Unit Dial, (HumaLOG KwikPen) 100 UNIT/ML solution pen-injector Inject 8 Units under the skin into the appropriate area as directed 3 (Three) Times a Day. Patient utilizes sliding scale to determine insulin administration 6/16/23  Yes July Johnson APRN   Insulin Pen Needle (Pen Needles) 32G X 6 MM misc    Yes Provider, MD Althea   lisinopril (PRINIVIL,ZESTRIL) 20 MG tablet Take 1 tablet by mouth Daily. 3/22/23  Yes July Johnson APRN   metFORMIN (GLUCOPHAGE) 1000 MG tablet Take 1 tablet by mouth 2 (Two) Times a  "Day With Meals. 6/16/23  Yes July Johnson APRN   Omega-3 Fatty Acids (fish oil) 1000 MG capsule capsule Take 2 capsules by mouth Daily With Breakfast. 6/16/23  Yes July Johnson APRN   omeprazole (priLOSEC) 20 MG capsule Take 1 capsule by mouth Daily. 6/16/23  Yes Juyl Johnson APRN   sildenafil (REVATIO) 20 MG tablet Take 1 tablet by mouth Daily. 4/7/23  Yes July Johnson APRN   triamcinolone (KENALOG) 0.1 % cream  11/10/20  Yes Provider, MD Althea   vitamin D (ERGOCALCIFEROL) 1.25 MG (84236 UT) capsule capsule Take 1 capsule by mouth 1 (One) Time Per Week. 6/16/23  Yes July Johnson APRN         Objective     Vital Signs: /71 (BP Location: Right arm, Patient Position: Sitting, Cuff Size: Adult)   Pulse 71   Temp 97.3 °F (36.3 °C) (Skin)   Resp 18   Ht 182.9 cm (72\")   Wt 88.2 kg (194 lb 6.4 oz)   SpO2 97%   BMI 26.37 kg/m²   Physical Exam  Constitutional:       General: He is not in acute distress.     Appearance: Normal appearance. He is normal weight. He is not ill-appearing.   HENT:      Head: Normocephalic and atraumatic.      Right Ear: External ear normal.      Left Ear: External ear normal.      Nose: Nose normal.      Mouth/Throat:      Mouth: Mucous membranes are moist.      Pharynx: No posterior oropharyngeal erythema.   Eyes:      General: No scleral icterus.     Extraocular Movements: Extraocular movements intact.      Conjunctiva/sclera: Conjunctivae normal.      Pupils: Pupils are equal, round, and reactive to light.   Cardiovascular:      Rate and Rhythm: Normal rate and regular rhythm.      Pulses: Normal pulses.      Heart sounds: Normal heart sounds.   Pulmonary:      Effort: Pulmonary effort is normal. No respiratory distress.      Breath sounds: Normal breath sounds. No wheezing.   Abdominal:      General: Abdomen is flat. Bowel sounds are normal.      Palpations: Abdomen is soft.      Tenderness: There is no abdominal tenderness.   Musculoskeletal:        "  General: Normal range of motion.      Cervical back: Normal range of motion.      Right lower leg: No edema.      Left lower leg: No edema.   Skin:     General: Skin is warm and dry.      Findings: No erythema or rash.   Neurological:      General: No focal deficit present.      Mental Status: He is alert and oriented to person, place, and time. Mental status is at baseline.      Motor: No weakness.   Psychiatric:         Mood and Affect: Mood normal.         Behavior: Behavior normal.         Thought Content: Thought content normal.         Judgment: Judgment normal.       Results Reviewed:  Glucose   Date Value Ref Range Status   06/16/2023 213 (H) 70 - 99 mg/dL Final     BUN   Date Value Ref Range Status   06/16/2023 19 6 - 24 mg/dL Final     Creatinine   Date Value Ref Range Status   06/16/2023 0.98 0.76 - 1.27 mg/dL Final     Sodium   Date Value Ref Range Status   06/16/2023 139 134 - 144 mmol/L Final     Potassium   Date Value Ref Range Status   06/16/2023 5.0 3.5 - 5.2 mmol/L Final     Chloride   Date Value Ref Range Status   06/16/2023 103 96 - 106 mmol/L Final     Total CO2   Date Value Ref Range Status   06/16/2023 22 20 - 29 mmol/L Final     Calcium   Date Value Ref Range Status   06/16/2023 9.4 8.7 - 10.2 mg/dL Final     ALT (SGPT)   Date Value Ref Range Status   06/16/2023 27 0 - 44 IU/L Final     AST (SGOT)   Date Value Ref Range Status   06/16/2023 20 0 - 40 IU/L Final     WBC   Date Value Ref Range Status   06/16/2023 6.1 3.4 - 10.8 x10E3/uL Final     Hematocrit   Date Value Ref Range Status   06/16/2023 45.4 37.5 - 51.0 % Final     Platelets   Date Value Ref Range Status   06/16/2023 312 150 - 450 x10E3/uL Final     Triglycerides   Date Value Ref Range Status   06/16/2023 90 0 - 149 mg/dL Final     HDL Cholesterol   Date Value Ref Range Status   06/16/2023 45 >39 mg/dL Final     LDL Chol Calc (NIH)   Date Value Ref Range Status   06/16/2023 96 0 - 99 mg/dL Final     Hemoglobin A1C   Date Value Ref  Range Status   06/16/2023 7.6 % Final         Assessment / Plan     Assessment/Plan:  1. Type 2 diabetes mellitus with hypoglycemia without coma, with long-term current use of insulin  - Hemoglobin A1c; Future    2. Hypertension, unspecified type  - CBC w AUTO Differential; Future  - Comprehensive metabolic panel; Future    3. High triglycerides  - Lipid panel; Future    4. Vitamin D deficiency  - Vitamin D 25 hydroxy; Future  - vitamin D (ERGOCALCIFEROL) 1.25 MG (60918 UT) capsule capsule; Take 1 capsule by mouth 1 (One) Time Per Week.  Dispense: 10 capsule; Refill: 3    5. Routine general medical examination at a health care facility    6. Screening for malignant neoplasm of prostate  - PSA SCREENING; Future      .Will have lab work here today. Discussed testicular self exams, patient is aware how to do testicular exams and the importance of same. Discussed weight management and importance of maintaining a healthy weight. Discussed Vitamin D intake and the importance of adequate vitamin D for both Bone Health and a healthy immune system.  Discussed daily exercise and the importance of same, in regards to a healthy heart as well as helping to maintain his weight and improving his mental health.  BMI  Colonoscopy is up to date. PSA will be drawn.     Return in about 3 months (around 12/8/2023) for Recheck. unless patient needs to be seen sooner or acute issues arise.      I have discussed the patient results/orders and and plan/recommendation with them at today's visit.      July Johnson, RADHA   09/08/2023

## 2023-11-06 DIAGNOSIS — Z79.4 TYPE 2 DIABETES MELLITUS WITH HYPOGLYCEMIA WITHOUT COMA, WITH LONG-TERM CURRENT USE OF INSULIN: ICD-10-CM

## 2023-11-06 DIAGNOSIS — E11.649 TYPE 2 DIABETES MELLITUS WITH HYPOGLYCEMIA WITHOUT COMA, WITH LONG-TERM CURRENT USE OF INSULIN: ICD-10-CM

## 2023-11-06 DIAGNOSIS — E55.9 VITAMIN D DEFICIENCY: ICD-10-CM

## 2023-11-06 RX ORDER — INSULIN LISPRO 100 [IU]/ML
8 INJECTION, SOLUTION INTRAVENOUS; SUBCUTANEOUS 3 TIMES DAILY
Qty: 12 ML | Refills: 1 | Status: SHIPPED | OUTPATIENT
Start: 2023-11-06

## 2023-11-06 RX ORDER — ERGOCALCIFEROL 1.25 MG/1
50000 CAPSULE ORAL WEEKLY
Qty: 10 CAPSULE | Refills: 3 | Status: SHIPPED | OUTPATIENT
Start: 2023-11-06

## 2023-11-06 NOTE — TELEPHONE ENCOUNTER
Caller: [facility] - [place], [place] - [address] [place] [address] - [phone] [facility] [phone] FX    Relationship: Pharmacy    Best call back number: [phone]     Requested Prescriptions:   Requested Prescriptions     Pending Prescriptions Disp Refills    vitamin D (ERGOCALCIFEROL) 1.25 MG (12852 UT) capsule capsule 10 capsule 3     Sig: Take 1 capsule by mouth 1 (One) Time Per Week.    Insulin Lispro, 1 Unit Dial, (HumaLOG KwikPen) 100 UNIT/ML solution pen-injector 12 mL 1     Sig: Inject 8 Units under the skin into the appropriate area as directed 3 (Three) Times a Day. Patient utilizes sliding scale to determine insulin administration        Pharmacy where request should be sent: [facility] - [place], [place] - [address] [place] [address] - [phone] [place] [phone] FX     Last office visit with prescribing clinician: 9/8/2023   Last telemedicine visit with prescribing clinician: Visit date not found   Next office visit with prescribing clinician: 12/8/2023     Additional details provided by patient: REQUESTING A 90 DAY SUPPLY  AND UP TO 3 REFILLS IF POSSIBLE     Does the patient have less than a 3 day supply:  [] Yes  [x] No    Would you like a call back once the refill request has been completed: [] Yes [x] No      Nithin Iniguez Rep   11/06/23 14:07 CST

## 2023-11-09 DIAGNOSIS — I10 HYPERTENSION, UNSPECIFIED TYPE: ICD-10-CM

## 2023-11-09 RX ORDER — AMLODIPINE BESYLATE 2.5 MG/1
2.5 TABLET ORAL DAILY
Qty: 90 TABLET | Refills: 1 | Status: SHIPPED | OUTPATIENT
Start: 2023-11-09

## 2023-11-28 DIAGNOSIS — Z79.4 TYPE 2 DIABETES MELLITUS WITH HYPOGLYCEMIA WITHOUT COMA, WITH LONG-TERM CURRENT USE OF INSULIN: ICD-10-CM

## 2023-11-28 DIAGNOSIS — E11.649 TYPE 2 DIABETES MELLITUS WITH HYPOGLYCEMIA WITHOUT COMA, WITH LONG-TERM CURRENT USE OF INSULIN: ICD-10-CM

## 2023-11-28 NOTE — TELEPHONE ENCOUNTER
Rx Refill Note  Requested Prescriptions     Pending Prescriptions Disp Refills    metFORMIN (GLUCOPHAGE) 1000 MG tablet 120 tablet 3     Sig: Take 1 tablet by mouth 2 (Two) Times a Day With Meals.      Last office visit with prescribing clinician: 9/8/2023   Last telemedicine visit with prescribing clinician: Visit date not found   Next office visit with prescribing clinician: 12/8/2023                         Would you like a call back once the refill request has been completed: [] Yes [] No    If the office needs to give you a call back, can they leave a voicemail: [] Yes [] No    Samia No MA  11/28/23, 10:13 CST

## 2023-12-08 ENCOUNTER — OFFICE VISIT (OUTPATIENT)
Dept: INTERNAL MEDICINE | Facility: CLINIC | Age: 53
End: 2023-12-08
Payer: COMMERCIAL

## 2023-12-08 VITALS
HEART RATE: 87 BPM | HEIGHT: 72 IN | BODY MASS INDEX: 25.87 KG/M2 | WEIGHT: 191 LBS | OXYGEN SATURATION: 98 % | DIASTOLIC BLOOD PRESSURE: 77 MMHG | SYSTOLIC BLOOD PRESSURE: 145 MMHG | RESPIRATION RATE: 16 BRPM | TEMPERATURE: 98.6 F

## 2023-12-08 DIAGNOSIS — I10 HYPERTENSION, UNSPECIFIED TYPE: ICD-10-CM

## 2023-12-08 DIAGNOSIS — E78.1 HIGH TRIGLYCERIDES: ICD-10-CM

## 2023-12-08 DIAGNOSIS — E11.649 TYPE 2 DIABETES MELLITUS WITH HYPOGLYCEMIA WITHOUT COMA, WITH LONG-TERM CURRENT USE OF INSULIN: Primary | ICD-10-CM

## 2023-12-08 DIAGNOSIS — Z79.4 TYPE 2 DIABETES MELLITUS WITH HYPOGLYCEMIA WITHOUT COMA, WITH LONG-TERM CURRENT USE OF INSULIN: Primary | ICD-10-CM

## 2023-12-08 LAB
EXPIRATION DATE: ABNORMAL
HBA1C MFR BLD: 7.9 % (ref 4.5–5.7)
Lab: ABNORMAL

## 2023-12-08 NOTE — PROGRESS NOTES
Subjective     Chief Complaint   Patient presents with    Diabetes     Follow up.        Diabetes  Pertinent negatives for hypoglycemia include no confusion or headaches. Pertinent negatives for diabetes include no chest pain, no fatigue and no weakness.     The patient presents for evaluation of type 2 diabetes    He has retired.     His hemoglobin has increased recent lab work to 7.9 percent from 7.6 percent. He is on long-acting Toujeo 50 units in the morning and short-acting insulin 8 units per meal on a sliding scale. His blood glucose levels depend on what he eats. He has a Dexcom. His average blood glucose is 185 mg/dL to 200 mg/dL. He is hoping that working less will help decrease his snacking. He is still taking metformin.    His blood pressure is slightly elevated today. He attributes this to stress. He is trying to find a counselor. He has an at home blood pressure machine. He is still taking lisinopril and amlodipine.    He reports diarrhea. It is not consistent. The stress has exacerbated the diarrhea. He does not eat a lot of fried foods.    He would like to get his influenza vaccination towards the end of next week.    Patient's PMR from outside medical facility reviewed and noted.    Review of Systems   Constitutional:  Negative for activity change, fatigue and unexpected weight change.   HENT:  Negative for congestion, ear pain, mouth sores, sore throat and trouble swallowing.    Eyes:  Negative for discharge and visual disturbance.   Respiratory:  Negative for cough and shortness of breath.    Cardiovascular:  Negative for chest pain and leg swelling.   Gastrointestinal:  Positive for diarrhea. Negative for abdominal pain, constipation and nausea.   Genitourinary:  Negative for decreased urine volume, difficulty urinating and hematuria.   Musculoskeletal:  Negative for back pain and gait problem.   Skin:  Negative for color change and rash.   Allergic/Immunologic: Negative for environmental  allergies and immunocompromised state.   Neurological:  Negative for weakness and headaches.   Psychiatric/Behavioral:  Negative for confusion and sleep disturbance.         Otherwise complete ROS reviewed and negative except as mentioned in the HPI.    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 He has never used smokeless tobacco. He reports current alcohol use. He reports that he does not use drugs.    Family History: family history includes Alzheimer's disease in his father; Anxiety disorder in his mother; Depression in his mother; Diabetes in his mother, sister, and sister; Hyperlipidemia in his mother; Hypertension in his mother.      Allergies:  Allergies   Allergen Reactions    Penicillins GI Intolerance     Medications:  Prior to Admission medications    Medication Sig Start Date End Date Taking? Authorizing Provider   amLODIPine (NORVASC) 2.5 MG tablet Take 1 tablet by mouth Daily. 11/9/23  Yes July Johnson APRN   Continuous Blood Gluc Sensor (Dexcom G6 Sensor) Every 10 (Ten) Days. 3/22/23  Yes July Johnson APRN   Continuous Blood Gluc Transmit (Dexcom G6 Transmitter) misc 1 each Daily. 3/16/23  Yes July Johsnon APRN   Insulin Glargine, 1 Unit Dial, (TOUJEO) 300 UNIT/ML solution pen-injector injection Inject 50 Units under the skin into the appropriate area as directed Daily. 9/8/23  Yes July Johnson APRN   Insulin Lispro, 1 Unit Dial, (HumaLOG KwikPen) 100 UNIT/ML solution pen-injector Inject 8 Units under the skin into the appropriate area as directed 3 (Three) Times a Day. Patient utilizes sliding scale to determine insulin administration 11/6/23  Yes July Johnson APRN  "  Insulin Pen Needle (Pen Needles) 32G X 6 MM misc    Yes Provider, MD Althea   lisinopril (PRINIVIL,ZESTRIL) 20 MG tablet Take 1 tablet by mouth Daily. 3/22/23  Yes July Johnson APRN   metFORMIN (GLUCOPHAGE) 1000 MG tablet Take 1 tablet by mouth 2 (Two) Times a Day With Meals. 11/28/23  Yes July Johnson APRN   Omega-3 Fatty Acids (fish oil) 1000 MG capsule capsule Take 2 capsules by mouth Daily With Breakfast. 6/16/23  Yes July Johnson APRN   omeprazole (priLOSEC) 20 MG capsule Take 1 capsule by mouth Daily. 6/16/23  Yes July Johnson APRN   sildenafil (REVATIO) 20 MG tablet Take 1 tablet by mouth Daily. 4/7/23  Yes July Johnson APRN   triamcinolone (KENALOG) 0.1 % cream  11/10/20  Yes Provider, MD Althea   vitamin D (ERGOCALCIFEROL) 1.25 MG (47925 UT) capsule capsule Take 1 capsule by mouth 1 (One) Time Per Week. 11/6/23  Yes July Johnson APRN       Objective     Vital Signs: /77 (BP Location: Left arm, Patient Position: Sitting, Cuff Size: Large Adult)   Pulse 87   Temp 98.6 °F (37 °C) (Infrared)   Resp 16   Ht 182.9 cm (72.01\")   Wt 86.6 kg (191 lb)   SpO2 98%   BMI 25.90 kg/m²   Physical Exam  Vitals and nursing note reviewed.   Constitutional:       General: He is not in acute distress.     Appearance: Normal appearance. He is not ill-appearing.   HENT:      Head: Normocephalic and atraumatic.      Right Ear: External ear normal.      Left Ear: External ear normal.      Nose: Nose normal.      Mouth/Throat:      Mouth: Mucous membranes are moist.      Pharynx: No posterior oropharyngeal erythema.   Eyes:      General: No scleral icterus.     Extraocular Movements: Extraocular movements intact.      Conjunctiva/sclera: Conjunctivae normal.      Pupils: Pupils are equal, round, and reactive to light.   Cardiovascular:      Rate and Rhythm: Normal rate and regular rhythm.      Pulses: Normal pulses.      Heart sounds: Normal heart sounds.   Pulmonary:      " Effort: Pulmonary effort is normal. No respiratory distress.      Breath sounds: Normal breath sounds. No wheezing.   Abdominal:      General: Abdomen is flat. Bowel sounds are normal.      Palpations: Abdomen is soft.      Tenderness: There is no abdominal tenderness.   Musculoskeletal:         General: Normal range of motion.      Cervical back: Normal range of motion.      Right lower leg: No edema.      Left lower leg: No edema.   Skin:     General: Skin is warm and dry.      Findings: No erythema or rash.   Neurological:      General: No focal deficit present.      Mental Status: He is alert and oriented to person, place, and time. Mental status is at baseline.      Motor: No weakness.   Psychiatric:         Mood and Affect: Mood normal.         Behavior: Behavior normal.         Thought Content: Thought content normal.         Judgment: Judgment normal.         Results Reviewed:  Glucose   Date Value Ref Range Status   12/08/2023 149 (H) 70 - 99 mg/dL Final     BUN   Date Value Ref Range Status   12/08/2023 20 6 - 24 mg/dL Final     Creatinine   Date Value Ref Range Status   12/08/2023 0.96 0.76 - 1.27 mg/dL Final     Sodium   Date Value Ref Range Status   12/08/2023 141 134 - 144 mmol/L Final     Potassium   Date Value Ref Range Status   12/08/2023 4.9 3.5 - 5.2 mmol/L Final     Chloride   Date Value Ref Range Status   12/08/2023 102 96 - 106 mmol/L Final     Total CO2   Date Value Ref Range Status   12/08/2023 25 20 - 29 mmol/L Final     Calcium   Date Value Ref Range Status   12/08/2023 9.7 8.7 - 10.2 mg/dL Final     ALT (SGPT)   Date Value Ref Range Status   12/08/2023 23 0 - 44 IU/L Final     AST (SGOT)   Date Value Ref Range Status   12/08/2023 17 0 - 40 IU/L Final     WBC   Date Value Ref Range Status   12/08/2023 7.2 3.4 - 10.8 x10E3/uL Final     Hematocrit   Date Value Ref Range Status   12/08/2023 44.9 37.5 - 51.0 % Final     Platelets   Date Value Ref Range Status   12/08/2023 332 150 - 450 x10E3/uL  Final     Triglycerides   Date Value Ref Range Status   12/08/2023 77 0 - 149 mg/dL Final     HDL Cholesterol   Date Value Ref Range Status   12/08/2023 45 >39 mg/dL Final     LDL Chol Calc (NIH)   Date Value Ref Range Status   12/08/2023 87 0 - 99 mg/dL Final     Hemoglobin A1C   Date Value Ref Range Status   12/08/2023 7.9 (A) 4.5 - 5.7 % Final         Assessment / Plan     Assessment/Plan:  1. Type 2 diabetes mellitus with hypoglycemia without coma, with long-term current use of insulin  - POC Glycosylated Hemoglobin (Hb A1C)  - Insulin Glargine, 1 Unit Dial, (TOUJEO) 300 UNIT/ML solution pen-injector injection; Inject 60 Units under the skin into the appropriate area as directed Every Morning.  Dispense: 6 mL; Refill: 3    2. Hypertension, unspecified type  - CBC w AUTO Differential  - Comprehensive metabolic panel    3. High triglycerides  - Lipid panel    1. Diabetes  - His Toujeo has been refilled and increased to 60 units in the morning. He will keep on his sliding scale with meals. His hemoglobin A1c goal is 7.3 percent.     2. Hypertension  His blood pressure is slightly elevated today. He will check his blood pressure for a couple of days and will send a [facility] message on 12/11/2023 with the readings. If his blood pressure continues to be elevated, medication adjustment may be needed.     3. Diarrhea  - He was advised to focus on eating blander foods when he is stressed. He will try to correlate the diarrhea with stress to see if this is the cause. If the diarrhea is not related to stress, medication adjustment for the metformin may be needed.     4. Health maintenance  - Updated lab work will be obtained today, 12/08/2023.    Return in about 3 months (around 3/8/2024) for Recheck. unless patient needs to be seen sooner or acute issues arise.      I have discussed the patient results/orders and and plan/recommendation with them at today's visit.      July Johnson, APRN   12/08/2023      Transcribed  from ambient dictation for RADHA Mccann by Claudia Nathan.  12/08/23   11:38 CST    Patient or patient representative verbalized consent to the visit recording.  I have personally performed the services described in this document as transcribed by the above individual, and it is both accurate and complete.

## 2023-12-09 LAB
ALBUMIN SERPL-MCNC: 4.2 G/DL (ref 3.8–4.9)
ALBUMIN/GLOB SERPL: 2 {RATIO} (ref 1.2–2.2)
ALP SERPL-CCNC: 82 IU/L (ref 44–121)
ALT SERPL-CCNC: 23 IU/L (ref 0–44)
AST SERPL-CCNC: 17 IU/L (ref 0–40)
BASOPHILS # BLD AUTO: 0.1 X10E3/UL (ref 0–0.2)
BASOPHILS NFR BLD AUTO: 1 %
BILIRUB SERPL-MCNC: 0.4 MG/DL (ref 0–1.2)
BUN SERPL-MCNC: 20 MG/DL (ref 6–24)
BUN/CREAT SERPL: 21 (ref 9–20)
CALCIUM SERPL-MCNC: 9.7 MG/DL (ref 8.7–10.2)
CHLORIDE SERPL-SCNC: 102 MMOL/L (ref 96–106)
CHOLEST SERPL-MCNC: 147 MG/DL (ref 100–199)
CO2 SERPL-SCNC: 25 MMOL/L (ref 20–29)
CREAT SERPL-MCNC: 0.96 MG/DL (ref 0.76–1.27)
EGFRCR SERPLBLD CKD-EPI 2021: 95 ML/MIN/1.73
EOSINOPHIL # BLD AUTO: 0.5 X10E3/UL (ref 0–0.4)
EOSINOPHIL NFR BLD AUTO: 7 %
ERYTHROCYTE [DISTWIDTH] IN BLOOD BY AUTOMATED COUNT: 12.1 % (ref 11.6–15.4)
GLOBULIN SER CALC-MCNC: 2.1 G/DL (ref 1.5–4.5)
GLUCOSE SERPL-MCNC: 149 MG/DL (ref 70–99)
HCT VFR BLD AUTO: 44.9 % (ref 37.5–51)
HDLC SERPL-MCNC: 45 MG/DL
HGB BLD-MCNC: 15.4 G/DL (ref 13–17.7)
IMM GRANULOCYTES # BLD AUTO: 0 X10E3/UL (ref 0–0.1)
IMM GRANULOCYTES NFR BLD AUTO: 0 %
LDLC SERPL CALC-MCNC: 87 MG/DL (ref 0–99)
LYMPHOCYTES # BLD AUTO: 1.9 X10E3/UL (ref 0.7–3.1)
LYMPHOCYTES NFR BLD AUTO: 27 %
MCH RBC QN AUTO: 31.4 PG (ref 26.6–33)
MCHC RBC AUTO-ENTMCNC: 34.3 G/DL (ref 31.5–35.7)
MCV RBC AUTO: 92 FL (ref 79–97)
MONOCYTES # BLD AUTO: 0.6 X10E3/UL (ref 0.1–0.9)
MONOCYTES NFR BLD AUTO: 8 %
NEUTROPHILS # BLD AUTO: 4.1 X10E3/UL (ref 1.4–7)
NEUTROPHILS NFR BLD AUTO: 57 %
PLATELET # BLD AUTO: 332 X10E3/UL (ref 150–450)
POTASSIUM SERPL-SCNC: 4.9 MMOL/L (ref 3.5–5.2)
PROT SERPL-MCNC: 6.3 G/DL (ref 6–8.5)
RBC # BLD AUTO: 4.9 X10E6/UL (ref 4.14–5.8)
SODIUM SERPL-SCNC: 141 MMOL/L (ref 134–144)
TRIGL SERPL-MCNC: 77 MG/DL (ref 0–149)
VLDLC SERPL CALC-MCNC: 15 MG/DL (ref 5–40)
WBC # BLD AUTO: 7.2 X10E3/UL (ref 3.4–10.8)

## 2023-12-12 NOTE — PROGRESS NOTES
Labs look ok. However, A1c has increased since when previous checked. We discussed increasing insulin dosing at last visit and I would like him to ensure that he does this. A1c was previously 7.6, has increased to 7.9

## 2024-01-10 DIAGNOSIS — I10 HYPERTENSION, UNSPECIFIED TYPE: ICD-10-CM

## 2024-01-10 RX ORDER — LISINOPRIL 20 MG/1
20 TABLET ORAL DAILY
Qty: 90 TABLET | Refills: 1 | Status: SHIPPED | OUTPATIENT
Start: 2024-01-10

## 2024-01-10 NOTE — TELEPHONE ENCOUNTER
Rx Refill Note  Requested Prescriptions     Pending Prescriptions Disp Refills    lisinopril (PRINIVIL,ZESTRIL) 20 MG tablet 90 tablet 1     Sig: Take 1 tablet by mouth Daily.      Last office visit with prescribing clinician: 12/8/2023   Last telemedicine visit with prescribing clinician: Visit date not found   Next office visit with prescribing clinician: Visit date not found                         Would you like a call back once the refill request has been completed: [] Yes [] No    If the office needs to give you a call back, can they leave a voicemail: [] Yes [] No    Samia No MA  01/10/24, 09:33 CST

## 2024-01-25 DIAGNOSIS — E55.9 VITAMIN D DEFICIENCY: ICD-10-CM

## 2024-01-25 RX ORDER — ERGOCALCIFEROL 1.25 MG/1
50000 CAPSULE ORAL WEEKLY
Qty: 10 CAPSULE | Refills: 3 | Status: SHIPPED | OUTPATIENT
Start: 2024-01-25

## 2024-02-19 RX ORDER — SILDENAFIL CITRATE 20 MG/1
20 TABLET ORAL DAILY
Qty: 30 TABLET | Refills: 0 | Status: SHIPPED | OUTPATIENT
Start: 2024-02-19

## 2024-02-19 NOTE — TELEPHONE ENCOUNTER
[facility]ANN[facility] PATIENT     Rx Refill Note  Requested Prescriptions     Pending Prescriptions Disp Refills    sildenafil (REVATIO) 20 MG tablet 30 tablet 0     Sig: Take 1 tablet by mouth Daily.      Last office visit with prescribing clinician: 12/8/2023   Last telemedicine visit with prescribing clinician: Visit date not found   Next office visit with prescribing clinician: 3/29/24                         Would you like a call back once the refill request has been completed: [] Yes [] No    If the office needs to give you a call back, can they leave a voicemail: [] Yes [] No    Kelley De La Cruz RN  02/19/24, 14:58 CST

## 2024-02-23 ENCOUNTER — OFFICE VISIT (OUTPATIENT)
Dept: INTERNAL MEDICINE | Facility: CLINIC | Age: 54
End: 2024-02-23
Payer: COMMERCIAL

## 2024-02-23 VITALS
BODY MASS INDEX: 26.14 KG/M2 | HEART RATE: 82 BPM | OXYGEN SATURATION: 97 % | RESPIRATION RATE: 16 BRPM | HEIGHT: 72 IN | DIASTOLIC BLOOD PRESSURE: 78 MMHG | SYSTOLIC BLOOD PRESSURE: 137 MMHG | WEIGHT: 193 LBS | TEMPERATURE: 96.6 F

## 2024-02-23 DIAGNOSIS — Z79.4 TYPE 2 DIABETES MELLITUS WITH HYPOGLYCEMIA WITHOUT COMA, WITH LONG-TERM CURRENT USE OF INSULIN: ICD-10-CM

## 2024-02-23 DIAGNOSIS — J01.40 ACUTE PANSINUSITIS, RECURRENCE NOT SPECIFIED: Primary | ICD-10-CM

## 2024-02-23 DIAGNOSIS — R50.9 FEVER, UNSPECIFIED FEVER CAUSE: ICD-10-CM

## 2024-02-23 DIAGNOSIS — E11.649 TYPE 2 DIABETES MELLITUS WITH HYPOGLYCEMIA WITHOUT COMA, WITH LONG-TERM CURRENT USE OF INSULIN: ICD-10-CM

## 2024-02-23 DIAGNOSIS — R51.9 NONINTRACTABLE HEADACHE, UNSPECIFIED CHRONICITY PATTERN, UNSPECIFIED HEADACHE TYPE: ICD-10-CM

## 2024-02-23 PROCEDURE — 99214 OFFICE O/P EST MOD 30 MIN: CPT

## 2024-02-23 RX ORDER — AZITHROMYCIN 250 MG/1
TABLET, FILM COATED ORAL
Qty: 6 TABLET | Refills: 0 | Status: SHIPPED | OUTPATIENT
Start: 2024-02-23

## 2024-02-23 RX ORDER — SUMATRIPTAN 50 MG/1
TABLET, FILM COATED ORAL
Qty: 15 TABLET | Refills: 0 | Status: SHIPPED | OUTPATIENT
Start: 2024-02-23

## 2024-02-23 NOTE — PROGRESS NOTES
Chief Complaint  Fever (Low grade since last Sunday) and Headache    Janelle Bonilla presents to [facility] PRIMARY [facility]  History of Present Illness  Patient is a 54-year-old male presenting today with complaints of fever and headache. Symptoms started Sunday 2/18/24. Fever has been up to 101.8 several days. Experiencing chills with the fever. Alternating tylenol and ibuprofen every 6 hours with relief.     Headache has been constant this whole week. Feels pressure behind his eyes and up in his forehead. Has had times where it is in the back of his head and radiates up to his forehead. Headache is worse with bending, coughing, sneezing. Tylenol and ibuprofen has provided relief.     With being ill this past week patient reports having had more difficulty in managing his diabetes. Reports noticing not responding as well to his fast acting insulin. Reports his glucose has been running 200-250 with it running 160-180 prior to this week.     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    Diabetes mellitus     Erectile dysfunction 2007    Hypertension      Past Surgical History:   Procedure Laterality Date    KIDNEY SURGERY Left      Social History     Socioeconomic History    Marital status:    Tobacco Use    Smoking status: Never     Passive exposure: Never    Smokeless tobacco: Never   Vaping Use    Vaping Use: Never used   Substance and Sexual Activity    Alcohol use: Yes    Drug use: Never    Sexual activity: Yes     Partners: Female     Birth control/protection: None     Family History   Problem Relation Age of Onset    Diabetes Mother     Anxiety disorder Mother     Depression Mother     Hyperlipidemia Mother     Hypertension Mother     Alzheimer's disease Father     Diabetes Sister     Diabetes Sister        Review of Systems   Constitutional:  Positive for fever.   Respiratory: Negative.     Cardiovascular: Negative.  "   Neurological:  Positive for headache.   Review of systems is negative unless otherwise specified.      Objective   Vital Signs:  /78 (BP Location: Right arm, Patient Position: Sitting, Cuff Size: Adult)   Pulse 82   Temp 96.6 °F (35.9 °C) (Infrared)   Resp 16   Ht 182.9 cm (72\")   Wt 87.5 kg (193 lb)   SpO2 97%   BMI 26.18 kg/m²   Estimated body mass index is 26.18 kg/m² as calculated from the following:    Height as of this encounter: 182.9 cm (72\").    Weight as of this encounter: 87.5 kg (193 lb).            PHQ-9 Depression Screening  Little interest or pleasure in doing things? 0-->not at all   Feeling down, depressed, or hopeless? 0-->not at all   Trouble falling or staying asleep, or sleeping too much?     Feeling tired or having little energy?     Poor appetite or overeating?     Feeling bad about yourself - or that you are a failure or have let yourself or your family down?     Trouble concentrating on things, such as reading the newspaper or watching television?     Moving or speaking so slowly that other people could have noticed? Or the opposite - being so fidgety or restless that you have been moving around a lot more than usual?     Thoughts that you would be better off dead, or of hurting yourself in some way?     PHQ-9 Total Score 0   If you checked off any problems, how difficult have these problems made it for you to do your work, take care of things at home, or get along with other people?         Physical Exam  Vitals and nursing note reviewed.   Constitutional:       Appearance: Normal appearance.   HENT:      Head: Normocephalic.      Right Ear: Tympanic membrane is bulging.      Left Ear: Tympanic membrane is bulging.      Nose:      Right Sinus: Maxillary sinus tenderness and frontal sinus tenderness present.      Left Sinus: Maxillary sinus tenderness and frontal sinus tenderness present.      Mouth/Throat:      Mouth: Mucous membranes are moist.      Pharynx: Oropharynx is " clear.   Eyes:      Pupils: Pupils are equal, round, and reactive to light.   Cardiovascular:      Rate and Rhythm: Normal rate and regular rhythm.      Pulses: Normal pulses.      Heart sounds: Normal heart sounds.   Pulmonary:      Effort: Pulmonary effort is normal. No respiratory distress.      Breath sounds: Normal breath sounds.   Abdominal:      General: Bowel sounds are normal.      Palpations: Abdomen is soft.   Musculoskeletal:         General: Normal range of motion.      Cervical back: Normal range of motion.   Skin:     General: Skin is warm and dry.      Capillary Refill: Capillary refill takes less than 2 seconds.   Neurological:      General: No focal deficit present.      Mental Status: He is alert.              Result Review :  The following data was reviewed by: RADHA Yo on 02/23/2024:  CMP          3/16/2023    08:29 6/16/2023    07:31 12/8/2023    07:10   CMP   Glucose 220  213  149    BUN 21  19  20    Creatinine 0.95  0.98  0.96    Sodium 137  139  141    Potassium 5.0  5.0  4.9    Chloride 101  103  102    Calcium 8.7  9.4  9.7    Total Protein 6.1  6.1  6.3    Albumin 3.9  4.0  4.2    Globulin 2.2  2.1  2.1    Total Bilirubin 0.3  0.4  0.4    Alkaline Phosphatase 91  93  82    AST (SGOT) 20  20  17    ALT (SGPT) 28  27  23    BUN/Creatinine Ratio 22  19  21      CBC w/diff          3/16/2023    08:29 6/16/2023    07:31 12/8/2023    07:10   CBC w/Diff   WBC 8.3  6.1  7.2    RBC 4.66  5.12  4.90    Hemoglobin 14.2  15.7  15.4    Hematocrit 42.4  45.4  44.9    MCV 91  89  92    MCH 30.5  30.7  31.4    MCHC 33.5  34.6  34.3    RDW 12.1  11.8  12.1    Platelets 381  312  332    Neutrophil Rel % 62  54  57    Lymphocyte Rel % 24  31  27    Monocyte Rel % 8  8  8    Eosinophil Rel % 4  6  7    Basophil Rel % 1  1  1      Lipid Panel          3/16/2023    08:29 6/16/2023    07:31 12/8/2023    07:10   Lipid Panel   Total Cholesterol 158  158  147    Triglycerides 203  90  77    HDL  Cholesterol 37  45  45    VLDL Cholesterol 35  17  15    LDL Cholesterol  86  96  87      A1C Last 3 Results          3/16/2023    09:02 6/16/2023    08:26 12/8/2023    08:08   HGBA1C Last 3 Results   Hemoglobin A1C 8.0  7.6  7.9                 Assessment and Plan   Diagnoses and all orders for this visit:    1. Acute pansinusitis, recurrence not specified (Primary)  -     azithromycin (Zithromax Z-Carlitos) 250 MG tablet; Take 2 tablets by mouth on day 1, then 1 tablet daily on days 2-5  Dispense: 6 tablet; Refill: 0    2. Nonintractable headache, unspecified chronicity pattern, unspecified headache type  -     SUMAtriptan (Imitrex) 50 MG tablet; Take one tablet at onset of headache. May repeat dose one time in 2 hours if headache not relieved.  Dispense: 15 tablet; Refill: 0    3. Fever, unspecified fever cause    4. Type 2 diabetes mellitus with hypoglycemia without coma, with long-term current use of insulin      - Initiate treatment of sinusitis with azithromycin.  - Will provide Imitrex as needed for non-intractable headache.  - Encouraged increased monitoring of blood glucose and diet control during illness.          Follow Up   Return if symptoms worsen or fail to improve.  Patient was given instructions and counseling regarding his condition or for health maintenance advice. Please see specific information pulled into the AVS if appropriate.     Signed by:    RADHA Yo Date: 02/29/24

## 2024-02-29 PROBLEM — E11.649 TYPE 2 DIABETES MELLITUS WITH HYPOGLYCEMIA WITHOUT COMA, WITH LONG-TERM CURRENT USE OF INSULIN: Status: ACTIVE | Noted: 2024-02-29

## 2024-02-29 PROBLEM — Z79.4 TYPE 2 DIABETES MELLITUS WITH HYPOGLYCEMIA WITHOUT COMA, WITH LONG-TERM CURRENT USE OF INSULIN: Status: ACTIVE | Noted: 2024-02-29

## 2024-03-20 ENCOUNTER — OFFICE VISIT (OUTPATIENT)
Dept: INTERNAL MEDICINE | Facility: CLINIC | Age: 54
End: 2024-03-20
Payer: COMMERCIAL

## 2024-03-20 VITALS
HEART RATE: 76 BPM | WEIGHT: 198.4 LBS | DIASTOLIC BLOOD PRESSURE: 76 MMHG | SYSTOLIC BLOOD PRESSURE: 134 MMHG | BODY MASS INDEX: 26.87 KG/M2 | OXYGEN SATURATION: 97 % | HEIGHT: 72 IN | TEMPERATURE: 97.8 F | RESPIRATION RATE: 16 BRPM

## 2024-03-20 DIAGNOSIS — R21 RASH OF HAND: ICD-10-CM

## 2024-03-20 DIAGNOSIS — E11.649 TYPE 2 DIABETES MELLITUS WITH HYPOGLYCEMIA WITHOUT COMA, WITH LONG-TERM CURRENT USE OF INSULIN: Primary | ICD-10-CM

## 2024-03-20 DIAGNOSIS — Z79.4 TYPE 2 DIABETES MELLITUS WITH HYPOGLYCEMIA WITHOUT COMA, WITH LONG-TERM CURRENT USE OF INSULIN: Primary | ICD-10-CM

## 2024-03-20 DIAGNOSIS — I10 HYPERTENSION, UNSPECIFIED TYPE: ICD-10-CM

## 2024-03-20 LAB
EXPIRATION DATE: ABNORMAL
HBA1C MFR BLD: 7.7 % (ref 4.5–5.7)
Lab: ABNORMAL

## 2024-03-20 RX ORDER — AMLODIPINE BESYLATE 2.5 MG/1
2.5 TABLET ORAL DAILY
Qty: 90 TABLET | Refills: 1 | Status: SHIPPED | OUTPATIENT
Start: 2024-03-20

## 2024-03-20 RX ORDER — ORAL SEMAGLUTIDE 3 MG/1
3 TABLET ORAL DAILY
Qty: 30 TABLET | Refills: 1 | Status: SHIPPED | OUTPATIENT
Start: 2024-03-20

## 2024-03-20 RX ORDER — TRIAMCINOLONE ACETONIDE 1 MG/G
CREAM TOPICAL 2 TIMES DAILY
Qty: 28.4 G | Refills: 2 | Status: SHIPPED | OUTPATIENT
Start: 2024-03-20

## 2024-03-20 NOTE — PROGRESS NOTES
Chief Complaint  Diabetes (Follow up) and Hypertension (Follow up)    Subjective        Aydin Bonilla presents to [facility]  History of Present Illness  Patient is a 54-year-old male presenting today for routine visit.  He reports having struggled with his diabetes. His A1c has gone from 7.9 previously to 7.7% today. He is surprised with this as he reports having started a new job the beginning of February and he has struggled with his schedule shifting from working 1st shift and 2nd shift. Trying to still work on diet, but has had difficulty with timing due to fluctuating shifts at work. Continues to wear dexcom CGM with reports of it showing an average of 180 to 210. Takes Troujeo 60 units in the morning and short-acting insulin with meals on a sliding scale. Still taking metformin 1000 mg two tablets a day, does report starting to notice stools becoming loose with this.     Does not routinely check his blood pressure at home. Reports checking if he feels it is elevated or his wife tells him he should. Taking 2.5 mg amlodipine and 20 mg lisinopril without issue.     Developed a rash on his right hand around last Wednesday/Thursday (3/13/24 - 3/14/24). Feels he may have been exposed to poison ivy. He has not tried any treatments yet.     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    Diabetes mellitus     Erectile dysfunction 2007    Hypertension      Past Surgical History:   Procedure Laterality Date    KIDNEY SURGERY Left      Social History     Socioeconomic History    Marital status:    Tobacco Use    Smoking status: Never     Passive exposure: Never    Smokeless tobacco: Never   Vaping Use    Vaping status: Never Used   Substance and Sexual Activity    Alcohol use: Yes    Drug use: Never    Sexual activity: Yes     Partners: Female     Birth control/protection: None     Family History   Problem Relation Age of Onset  "   Diabetes Mother     Anxiety disorder Mother     Depression Mother     Hyperlipidemia Mother     Hypertension Mother     Alzheimer's disease Father     Diabetes Sister     Diabetes Sister        Review of Systems  Review of systems is negative unless otherwise specified in HPI.    Objective   Vital Signs:  /76 (BP Location: Right arm, Patient Position: Sitting, Cuff Size: Adult)   Pulse 76   Temp 97.8 °F (36.6 °C) (Infrared)   Resp 16   Ht 182.9 cm (72\")   Wt 90 kg (198 lb 6.4 oz)   SpO2 97%   BMI 26.91 kg/m²   Estimated body mass index is 26.91 kg/m² as calculated from the following:    Height as of this encounter: 182.9 cm (72\").    Weight as of this encounter: 90 kg (198 lb 6.4 oz).          Physical Exam  Vitals and nursing note reviewed.   Constitutional:       General: He is not in acute distress.     Appearance: Normal appearance. He is not ill-appearing.   HENT:      Head: Normocephalic.      Nose: Nose normal.      Mouth/Throat:      Mouth: Mucous membranes are moist.      Pharynx: Oropharynx is clear.   Eyes:      Pupils: Pupils are equal, round, and reactive to light.   Cardiovascular:      Rate and Rhythm: Normal rate and regular rhythm.      Pulses: Normal pulses.      Heart sounds: Normal heart sounds.   Pulmonary:      Effort: Pulmonary effort is normal. No respiratory distress.      Breath sounds: Normal breath sounds.   Abdominal:      General: Bowel sounds are normal.      Palpations: Abdomen is soft.   Musculoskeletal:         General: Normal range of motion.      Cervical back: Normal range of motion.   Skin:     General: Skin is warm and dry.      Capillary Refill: Capillary refill takes less than 2 seconds.   Neurological:      General: No focal deficit present.      Mental Status: He is alert.          Result Review :  The following data was reviewed by: RADHA Yo on 03/20/2024:  CMP          6/16/2023    07:31 12/8/2023    07:10   CMP   Glucose 213  149    BUN 19  " 20    Creatinine 0.98  0.96    Sodium 139  141    Potassium 5.0  4.9    Chloride 103  102    Calcium 9.4  9.7    Total Protein 6.1  6.3    Albumin 4.0  4.2    Globulin 2.1  2.1    Total Bilirubin 0.4  0.4    Alkaline Phosphatase 93  82    AST (SGOT) 20  17    ALT (SGPT) 27  23    BUN/Creatinine Ratio 19  21      CBC w/diff          6/16/2023    07:31 12/8/2023    07:10   CBC w/Diff   WBC 6.1  7.2    RBC 5.12  4.90    Hemoglobin 15.7  15.4    Hematocrit 45.4  44.9    MCV 89  92    MCH 30.7  31.4    MCHC 34.6  34.3    RDW 11.8  12.1    Platelets 312  332    Neutrophil Rel % 54  57    Lymphocyte Rel % 31  27    Monocyte Rel % 8  8    Eosinophil Rel % 6  7    Basophil Rel % 1  1      Lipid Panel          6/16/2023    07:31 12/8/2023    07:10   Lipid Panel   Total Cholesterol 158  147    Triglycerides 90  77    HDL Cholesterol 45  45    VLDL Cholesterol 17  15    LDL Cholesterol  96  87      A1C Last 3 Results          6/16/2023    08:26 12/8/2023    08:08 3/20/2024    15:58   HGBA1C Last 3 Results   Hemoglobin A1C 7.6  7.9  7.7               Assessment and Plan   Diagnoses and all orders for this visit:    1. Type 2 diabetes mellitus with hypoglycemia without coma, with long-term current use of insulin (Primary)  -     POC Glycosylated Hemoglobin (Hb A1C)  -     Semaglutide (Rybelsus) 3 MG tablet; Take 1 tablet by mouth Daily.  Dispense: 30 tablet; Refill: 1  -     Insulin Glargine, 1 Unit Dial, (TOUJEO) 300 UNIT/ML solution pen-injector injection; Inject 60 Units under the skin into the appropriate area as directed Every Morning.  Dispense: 18 mL; Refill: 1    2. Hypertension, unspecified type  -     amLODIPine (NORVASC) 2.5 MG tablet; Take 1 tablet by mouth Daily.  Dispense: 90 tablet; Refill: 1    3. Rash of hand    Other orders  -     triamcinolone (KENALOG) 0.1 % cream; Apply  topically to the appropriate area as directed 2 (Two) Times a Day.  Dispense: 28.4 g; Refill: 2      - Will add Rybelsus for further blood  glucose control. Continue Insulin as previously prescribed at this time.   - Blood pressure is reasonably controlled.          Follow Up   Return in about 3 months (around 6/20/2024) for Recheck.  Patient was given instructions and counseling regarding his condition or for health maintenance advice. Please see specific information pulled into the AVS if appropriate.     Signed by:    RADHA Yo Date: 03/21/24

## 2024-03-21 PROBLEM — I10 HYPERTENSION: Status: ACTIVE | Noted: 2024-03-21

## 2024-03-22 ENCOUNTER — PRIOR AUTHORIZATION (OUTPATIENT)
Dept: INTERNAL MEDICINE | Facility: CLINIC | Age: 54
End: 2024-03-22
Payer: COMMERCIAL

## 2024-04-15 DIAGNOSIS — Z79.4 TYPE 2 DIABETES MELLITUS WITH HYPOGLYCEMIA WITHOUT COMA, WITH LONG-TERM CURRENT USE OF INSULIN: ICD-10-CM

## 2024-04-15 DIAGNOSIS — E11.649 TYPE 2 DIABETES MELLITUS WITH HYPOGLYCEMIA WITHOUT COMA, WITH LONG-TERM CURRENT USE OF INSULIN: ICD-10-CM

## 2024-04-15 DIAGNOSIS — I10 HYPERTENSION, UNSPECIFIED TYPE: ICD-10-CM

## 2024-04-15 RX ORDER — AMLODIPINE BESYLATE 2.5 MG/1
2.5 TABLET ORAL DAILY
Qty: 90 TABLET | Refills: 3 | Status: SHIPPED | OUTPATIENT
Start: 2024-04-15

## 2024-04-15 NOTE — TELEPHONE ENCOUNTER
SHANELLE AT [facility]     Rx Refill Note  Requested Prescriptions     Pending Prescriptions Disp Refills    amLODIPine (NORVASC) 2.5 MG tablet 90 tablet 1     Sig: Take 1 tablet by mouth Daily.      Last office visit with prescribing clinician: 3/20/2024   Last telemedicine visit with prescribing clinician: Visit date not found   Next office visit with prescribing clinician: Visit date not found                         Would you like a call back once the refill request has been completed: [] Yes [] No    If the office needs to give you a call back, can they leave a voicemail: [] Yes [] No    Kelley De La Cruz RN  04/15/24, 09:25 CDT

## 2024-04-15 NOTE — TELEPHONE ENCOUNTER
NEEDS SENT TO [facility]       Rx Refill Note  Requested Prescriptions     Pending Prescriptions Disp Refills    Insulin Glargine, 1 Unit Dial, (TOUJEO) 300 UNIT/ML solution pen-injector injection 18 mL 1     Sig: Inject 60 Units under the skin into the appropriate area as directed Every Morning.      Last office visit with prescribing clinician: 3/20/2024   Last telemedicine visit with prescribing clinician: Visit date not found   Next office visit with prescribing clinician: Visit date not found                         Would you like a call back once the refill request has been completed: [] Yes [] No    If the office needs to give you a call back, can they leave a voicemail: [] Yes [] No    Kelley De La Cruz RN  04/15/24, 09:28 CDT

## 2024-04-22 DIAGNOSIS — I10 HYPERTENSION, UNSPECIFIED TYPE: ICD-10-CM

## 2024-04-22 RX ORDER — LISINOPRIL 20 MG/1
20 TABLET ORAL DAILY
Qty: 90 TABLET | Refills: 1 | Status: SHIPPED | OUTPATIENT
Start: 2024-04-22

## 2024-06-13 ENCOUNTER — OFFICE VISIT (OUTPATIENT)
Dept: INTERNAL MEDICINE | Facility: CLINIC | Age: 54
End: 2024-06-13
Payer: COMMERCIAL

## 2024-06-13 VITALS
TEMPERATURE: 98.4 F | DIASTOLIC BLOOD PRESSURE: 75 MMHG | SYSTOLIC BLOOD PRESSURE: 136 MMHG | WEIGHT: 185 LBS | BODY MASS INDEX: 25.06 KG/M2 | OXYGEN SATURATION: 98 % | HEART RATE: 79 BPM | HEIGHT: 72 IN

## 2024-06-13 DIAGNOSIS — M79.89 LEG SWELLING: Primary | ICD-10-CM

## 2024-06-13 DIAGNOSIS — E11.649 TYPE 2 DIABETES MELLITUS WITH HYPOGLYCEMIA WITHOUT COMA, WITH LONG-TERM CURRENT USE OF INSULIN: ICD-10-CM

## 2024-06-13 DIAGNOSIS — I10 PRIMARY HYPERTENSION: ICD-10-CM

## 2024-06-13 DIAGNOSIS — Z79.4 TYPE 2 DIABETES MELLITUS WITH HYPOGLYCEMIA WITHOUT COMA, WITH LONG-TERM CURRENT USE OF INSULIN: ICD-10-CM

## 2024-06-13 PROBLEM — E78.2 MIXED HYPERLIPIDEMIA: Status: ACTIVE | Noted: 2024-06-13

## 2024-06-13 PROBLEM — N52.8 OTHER MALE ERECTILE DYSFUNCTION: Status: ACTIVE | Noted: 2024-06-13

## 2024-06-13 PROBLEM — K21.9 GASTROESOPHAGEAL REFLUX DISEASE WITHOUT ESOPHAGITIS: Status: ACTIVE | Noted: 2024-06-13

## 2024-06-13 PROCEDURE — 99214 OFFICE O/P EST MOD 30 MIN: CPT | Performed by: FAMILY MEDICINE

## 2024-06-13 NOTE — PROGRESS NOTES
Subjective     Chief Complaint   Patient presents with    Hypertension    Diabetes       History of Present Illness    Aydin Bonilla is a 54 y.o. male who presents for a routine visit at this time. Patient presents for follow up of diabetes.. Current symptoms include: none. Patient denies hypoglycemia , polydipsia, and polyuria.  Home sugars: BGs consistently in an acceptable range. Current treatments: no recent interventions.  Patient is interested in changing out to Mounjaro has tried to get Rybelsus in the past and this was declined by his insurance to check prices and he is okay with Mounjaro at this time.  Will send in an initial prescription patient advised to call back in 30 days if he is tolerating well we will increase the dosage.  Obtain a hemoglobin A1c as well as a comprehensive metabolic panel to evaluate this at this time.    Patient also has several red areas on his left lower extremity.  These are very tender to the touch and have been there for approximately 5 weeks.  I believe these to represent some superficial venous thrombosis we will start the patient on aspirin 325 mg initially and obtain an ultrasound today.    His hypertension remains well-controlled at this time    Patient's PMR from outside medical facility reviewed and noted.      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 He has never been exposed to tobacco smoke. He has never used smokeless tobacco. He reports current alcohol use. He reports that he does not use drugs.    Family History: family history includes Alzheimer's disease in his father; Anxiety disorder in his mother; Depression in his mother; Diabetes in his mother,  sister, and sister; Hyperlipidemia in his mother; Hypertension in his mother.      Allergies:  Allergies   Allergen Reactions    Penicillins GI Intolerance     Medications:  Prior to Admission medications    Medication Sig Start Date End Date Taking? Authorizing Provider   amLODIPine (NORVASC) 2.5 MG tablet Take 1 tablet by mouth Daily. 4/15/24  Yes Jefry Blackman MD   Continuous Blood Gluc Sensor (Dexcom G6 Sensor) Every 10 (Ten) Days. 3/22/23  Yes July Johnson APRN   Continuous Blood Gluc Transmit (Dexcom G6 Transmitter) misc 1 each Daily. 3/16/23  Yes July Johnson APRN   Insulin Glargine, 1 Unit Dial, (TOUJEO) 300 UNIT/ML solution pen-injector injection Inject 60 Units under the skin into the appropriate area as directed Every Morning. 4/15/24  Yes Jefry Blackman MD   Insulin Lispro, 1 Unit Dial, (HumaLOG KwikPen) 100 UNIT/ML solution pen-injector Inject 8 Units under the skin into the appropriate area as directed 3 (Three) Times a Day. Patient utilizes sliding scale to determine insulin administration 11/6/23  Yes July Johnson APRN   Insulin Pen Needle (Pen Needles) 32G X 6 MM misc    Yes Provider, MD Althea   lisinopril (PRINIVIL,ZESTRIL) 20 MG tablet Take 1 tablet by mouth Daily. 4/22/24  Yes July Johnson APRN   metFORMIN (GLUCOPHAGE) 1000 MG tablet Take 1 tablet by mouth 2 (Two) Times a Day With Meals. 11/28/23  Yes July Johnson APRN   Omega-3 Fatty Acids (fish oil) 1000 MG capsule capsule Take 2 capsules by mouth Daily With Breakfast. 6/16/23  Yes July Johnson APRN   omeprazole (priLOSEC) 20 MG capsule Take 1 capsule by mouth Daily. 6/16/23  Yes July Johnson APRN   sildenafil (REVATIO) 20 MG tablet Take 1 tablet by mouth Daily. 2/19/24  Yes Devin De La Cruz APRN   SUMAtriptan (Imitrex) 50 MG tablet Take one tablet at onset of headache. May repeat dose one time in 2 hours if headache not relieved. 2/23/24  Yes Devin De La Cruz, APRN  "  triamcinolone (KENALOG) 0.1 % cream Apply  topically to the appropriate area as directed 2 (Two) Times a Day. 3/20/24  Yes Devin De La Cruz APRN   vitamin D (ERGOCALCIFEROL) 1.25 MG (21136 UT) capsule capsule Take 1 capsule by mouth 1 (One) Time Per Week. 1/25/24  Yes Devin De La Cruz APRN   Semaglutide (Rybelsus) 3 MG tablet Take 1 tablet by mouth Daily.  Patient not taking: Reported on 6/13/2024 3/20/24   Devin De La Cruz APRN           Review of systems   negative unless otherwise specified above in HPI    Objective     Vital Signs: /75 (BP Location: Left arm, Patient Position: Sitting, Cuff Size: Adult)   Pulse 79   Temp 98.4 °F (36.9 °C) (Infrared)   Ht 182.9 cm (72\")   Wt 83.9 kg (185 lb)   SpO2 98%   BMI 25.09 kg/m²     Physical Exam  Vitals and nursing note reviewed.   Constitutional:       General: He is not in acute distress.     Appearance: Normal appearance.   HENT:      Head: Normocephalic.   Eyes:      Extraocular Movements: Extraocular movements intact.      Pupils: Pupils are equal, round, and reactive to light.   Cardiovascular:      Rate and Rhythm: Normal rate and regular rhythm.      Heart sounds: Normal heart sounds. No murmur heard.  Pulmonary:      Effort: Pulmonary effort is normal. No respiratory distress.      Breath sounds: Normal breath sounds. No rhonchi or rales.   Abdominal:      General: Abdomen is flat. Bowel sounds are normal.      Palpations: Abdomen is soft.   Neurological:      General: No focal deficit present.      Mental Status: He is alert.                Results Reviewed:  Glucose   Date Value Ref Range Status   12/08/2023 149 (H) 70 - 99 mg/dL Final     BUN   Date Value Ref Range Status   12/08/2023 20 6 - 24 mg/dL Final     Creatinine   Date Value Ref Range Status   12/08/2023 0.96 0.76 - 1.27 mg/dL Final     Sodium   Date Value Ref Range Status   12/08/2023 141 134 - 144 mmol/L Final     Potassium   Date Value Ref Range Status   12/08/2023 4.9 3.5 - 5.2 " mmol/L Final     Chloride   Date Value Ref Range Status   12/08/2023 102 96 - 106 mmol/L Final     Total CO2   Date Value Ref Range Status   12/08/2023 25 20 - 29 mmol/L Final     Calcium   Date Value Ref Range Status   12/08/2023 9.7 8.7 - 10.2 mg/dL Final     ALT (SGPT)   Date Value Ref Range Status   12/08/2023 23 0 - 44 IU/L Final     AST (SGOT)   Date Value Ref Range Status   12/08/2023 17 0 - 40 IU/L Final     WBC   Date Value Ref Range Status   12/08/2023 7.2 3.4 - 10.8 x10E3/uL Final     Hematocrit   Date Value Ref Range Status   12/08/2023 44.9 37.5 - 51.0 % Final     Platelets   Date Value Ref Range Status   12/08/2023 332 150 - 450 x10E3/uL Final     Triglycerides   Date Value Ref Range Status   12/08/2023 77 0 - 149 mg/dL Final     HDL Cholesterol   Date Value Ref Range Status   12/08/2023 45 >39 mg/dL Final     LDL Chol Calc (NIH)   Date Value Ref Range Status   12/08/2023 87 0 - 99 mg/dL Final     Hemoglobin A1C   Date Value Ref Range Status   03/20/2024 7.7 (A) 4.5 - 5.7 % Final                 Assessment / Plan     Assessment/Plan:   Diagnosis Plan   1. Leg swelling  CBC & Differential    US Venous Doppler Lower Extremity Left (duplex)      2. Type 2 diabetes mellitus with hypoglycemia without coma, with long-term current use of insulin  Hemoglobin A1c    Comprehensive Metabolic Panel    Tirzepatide (MOUNJARO) 2.5 MG/0.5ML solution pen-injector pen    metFORMIN (GLUCOPHAGE) 1000 MG tablet    metFORMIN (GLUCOPHAGE) 1000 MG tablet      3. Primary hypertension              Return in about 3 months (around 9/13/2024), or if symptoms worsen or fail to improve. unless patient needs to be seen sooner or acute issues arise.      I have discussed the patient results/orders and and plan/recommendation with them at today's visit.      Signed by:    Jefry Blackman MD Date: 06/13/24

## 2024-06-15 LAB
ALBUMIN SERPL-MCNC: 4.1 G/DL (ref 3.8–4.9)
ALBUMIN/GLOB SERPL: 1.8 {RATIO}
ALP SERPL-CCNC: 90 IU/L (ref 44–121)
ALT SERPL-CCNC: 21 IU/L (ref 0–44)
AST SERPL-CCNC: 18 IU/L (ref 0–40)
BASOPHILS # BLD AUTO: 0 X10E3/UL (ref 0–0.2)
BASOPHILS NFR BLD AUTO: 1 %
BILIRUB SERPL-MCNC: 0.4 MG/DL (ref 0–1.2)
BUN SERPL-MCNC: 15 MG/DL (ref 6–24)
BUN/CREAT SERPL: 16 (ref 9–20)
CALCIUM SERPL-MCNC: 9.1 MG/DL (ref 8.7–10.2)
CHLORIDE SERPL-SCNC: 106 MMOL/L (ref 96–106)
CO2 SERPL-SCNC: 18 MMOL/L (ref 20–29)
CREAT SERPL-MCNC: 0.95 MG/DL (ref 0.76–1.27)
EGFRCR SERPLBLD CKD-EPI 2021: 95 ML/MIN/1.73
EOSINOPHIL # BLD AUTO: 0.2 X10E3/UL (ref 0–0.4)
EOSINOPHIL NFR BLD AUTO: 3 %
ERYTHROCYTE [DISTWIDTH] IN BLOOD BY AUTOMATED COUNT: 12.2 % (ref 11.6–15.4)
GLOBULIN SER CALC-MCNC: 2.3 G/DL (ref 1.5–4.5)
GLUCOSE SERPL-MCNC: 241 MG/DL (ref 70–99)
HBA1C MFR BLD: 7.7 % (ref 4.8–5.6)
HCT VFR BLD AUTO: 41.4 % (ref 37.5–51)
HGB BLD-MCNC: 13.9 G/DL (ref 13–17.7)
IMM GRANULOCYTES # BLD AUTO: 0 X10E3/UL (ref 0–0.1)
IMM GRANULOCYTES NFR BLD AUTO: 0 %
LYMPHOCYTES # BLD AUTO: 2.2 X10E3/UL (ref 0.7–3.1)
LYMPHOCYTES NFR BLD AUTO: 34 %
MCH RBC QN AUTO: 30.3 PG (ref 26.6–33)
MCHC RBC AUTO-ENTMCNC: 33.6 G/DL (ref 31.5–35.7)
MCV RBC AUTO: 90 FL (ref 79–97)
MONOCYTES # BLD AUTO: 0.5 X10E3/UL (ref 0.1–0.9)
MONOCYTES NFR BLD AUTO: 7 %
NEUTROPHILS # BLD AUTO: 3.6 X10E3/UL (ref 1.4–7)
NEUTROPHILS NFR BLD AUTO: 55 %
PLATELET # BLD AUTO: 275 X10E3/UL (ref 150–450)
POTASSIUM SERPL-SCNC: 4.6 MMOL/L (ref 3.5–5.2)
PROT SERPL-MCNC: 6.4 G/DL (ref 6–8.5)
RBC # BLD AUTO: 4.58 X10E6/UL (ref 4.14–5.8)
SODIUM SERPL-SCNC: 139 MMOL/L (ref 134–144)
WBC # BLD AUTO: 6.6 X10E3/UL (ref 3.4–10.8)

## 2024-06-18 ENCOUNTER — PRIOR AUTHORIZATION (OUTPATIENT)
Dept: INTERNAL MEDICINE | Facility: CLINIC | Age: 54
End: 2024-06-18
Payer: COMMERCIAL

## 2024-06-18 NOTE — TELEPHONE ENCOUNTER
Mahendra Bonilla (Key: [id number])  PA Case ID #: [id number]  Rx #: [id number]  Need Help? Call us at [phone]  Status  sent iconSent to Plan today  Drug  Mounjaro 2.5MG/0.5ML pen-injectors  ePA cloud logo  Form  [employer] Electronic PA Form (2017 NCPDP)  Original Claim Info  75

## 2024-06-20 NOTE — TELEPHONE ENCOUNTER
Mahendra Bonilla (Key: [id number])  PA Case ID #: [id number]  Rx #: [id number]  Need Help? Call us at [phone]  Status  sent iconSent to Plan today  Drug  Mounjaro 2.5MG/0.5ML pen-injectors  ePA cloud logo  Form  [employer] Electronic PA Form (2017 NCPDP)  Original Claim Info  75  Denied on June 19  CaseId:[id number];Status:Denied;Review Type:Prior Auth;Appeal Information: [employer] [address],[place], [place],[address] Phone:[phone] Fax:[phone]; Important - Please read the below note on eAppeals: Please reference the denial letter for information on the rights for an appeal, rationale for the denial, and how to submit an appeal including if any information is needed to support the appeal. Note about urgent situations - Generally, an urgent situation is one which, in the opinion of the provider, the health of the patient may be in serious jeopardy or may experience pain that cannot be adequately controlled while waiting for a decision on the appeal.;

## 2024-07-09 DIAGNOSIS — E11.649 TYPE 2 DIABETES MELLITUS WITH HYPOGLYCEMIA WITHOUT COMA, WITH LONG-TERM CURRENT USE OF INSULIN: ICD-10-CM

## 2024-07-09 DIAGNOSIS — Z79.4 TYPE 2 DIABETES MELLITUS WITH HYPOGLYCEMIA WITHOUT COMA, WITH LONG-TERM CURRENT USE OF INSULIN: ICD-10-CM

## 2024-07-09 RX ORDER — PROCHLORPERAZINE 25 MG/1
SUPPOSITORY RECTAL
Qty: 9 EACH | Refills: 3 | Status: SHIPPED | OUTPATIENT
Start: 2024-07-09

## 2024-07-09 RX ORDER — PROCHLORPERAZINE 25 MG/1
1 SUPPOSITORY RECTAL DAILY
Qty: 6 EACH | Refills: 3 | Status: SHIPPED | OUTPATIENT
Start: 2024-07-09

## 2024-07-09 NOTE — TELEPHONE ENCOUNTER
Rx Refill Note  Requested Prescriptions     Pending Prescriptions Disp Refills    Continuous Glucose Sensor (Dexcom G6 Sensor) 9 each 3     Sig: Use Every 10 (Ten) Days.    Continuous Glucose Transmitter (Dexcom G6 Transmitter) misc 6 each 3     Sig: Use 1 each Daily.      Last office visit with prescribing clinician: 6/13/2024   Last telemedicine visit with prescribing clinician: Visit date not found   Next office visit with prescribing clinician: 9/12/2024                         Would you like a call back once the refill request has been completed: [] Yes [] No    If the office needs to give you a call back, can they leave a voicemail: [] Yes [] No    Samia No MA  07/09/24, 11:34 CDT

## 2024-08-08 RX ORDER — TRIAMCINOLONE ACETONIDE 1 MG/G
CREAM TOPICAL 2 TIMES DAILY
Qty: 28.4 G | Refills: 2 | Status: SHIPPED | OUTPATIENT
Start: 2024-08-08

## 2024-08-08 NOTE — TELEPHONE ENCOUNTER
Rx Refill Note  Requested Prescriptions     Pending Prescriptions Disp Refills    triamcinolone (KENALOG) 0.1 % cream 28.4 g 2     Sig: Apply  topically to the appropriate area as directed 2 (Two) Times a Day.      Last office visit with prescribing clinician: 6/13/2024   Last telemedicine visit with prescribing clinician: Visit date not found   Next office visit with prescribing clinician: 9/12/2024                         Would you like a call back once the refill request has been completed: [] Yes [] No    If the office needs to give you a call back, can they leave a voicemail: [] Yes [] No    Kelley De La Cruz RN  08/08/24, 07:06 CDT

## 2024-10-03 ENCOUNTER — OFFICE VISIT (OUTPATIENT)
Dept: INTERNAL MEDICINE | Facility: CLINIC | Age: 54
End: 2024-10-03
Payer: COMMERCIAL

## 2024-10-03 VITALS
SYSTOLIC BLOOD PRESSURE: 158 MMHG | HEART RATE: 73 BPM | BODY MASS INDEX: 26.68 KG/M2 | DIASTOLIC BLOOD PRESSURE: 80 MMHG | WEIGHT: 197 LBS | HEIGHT: 72 IN | RESPIRATION RATE: 18 BRPM | OXYGEN SATURATION: 97 %

## 2024-10-03 DIAGNOSIS — G47.9 SLEEP DISTURBANCES: ICD-10-CM

## 2024-10-03 DIAGNOSIS — Z79.4 TYPE 2 DIABETES MELLITUS WITH HYPOGLYCEMIA WITHOUT COMA, WITH LONG-TERM CURRENT USE OF INSULIN: ICD-10-CM

## 2024-10-03 DIAGNOSIS — E11.649 TYPE 2 DIABETES MELLITUS WITH HYPOGLYCEMIA WITHOUT COMA, WITH LONG-TERM CURRENT USE OF INSULIN: ICD-10-CM

## 2024-10-03 DIAGNOSIS — E78.1 HIGH TRIGLYCERIDES: Primary | ICD-10-CM

## 2024-10-03 PROCEDURE — 99214 OFFICE O/P EST MOD 30 MIN: CPT

## 2024-10-03 RX ORDER — INSULIN GLARGINE 100 [IU]/ML
60 INJECTION, SOLUTION SUBCUTANEOUS DAILY
Qty: 10 ML | Refills: 12 | Status: SHIPPED | OUTPATIENT
Start: 2024-10-03

## 2024-10-03 RX ORDER — BLOOD SUGAR DIAGNOSTIC
1 STRIP MISCELLANEOUS 4 TIMES DAILY
Qty: 100 EACH | Refills: 12 | Status: SHIPPED | OUTPATIENT
Start: 2024-10-03

## 2024-10-03 RX ORDER — INSULIN LISPRO 100 [IU]/ML
8 INJECTION, SOLUTION INTRAVENOUS; SUBCUTANEOUS
Qty: 10 ML | Refills: 12 | Status: SHIPPED | OUTPATIENT
Start: 2024-10-03

## 2024-10-03 RX ORDER — TRAZODONE HYDROCHLORIDE 50 MG/1
50 TABLET, FILM COATED ORAL NIGHTLY
Qty: 30 TABLET | Refills: 0 | Status: SHIPPED | OUTPATIENT
Start: 2024-10-03

## 2024-10-03 NOTE — PROGRESS NOTES
Subjective     Chief Complaint   Patient presents with    Diabetes       Diabetes  Pertinent negatives for diabetes include no chest pain and no fatigue.     History of Present Illness  The patient presents for evaluation of multiple medical concerns.    He reports that his A1c levels have not been optimal. Devin De La Cruz attempted to switch him to new medications, including an injectable(mounjaro), but the request was denied without explanation. Currently, he is on Toujeo, humalog and metformin, with a slight reduction in the Toujeo dosage from 60 units every morning. He also takes Humalog, 8 units three times a day. He has a sufficient supply of both medications. He is not experiencing chest pain, shortness of breath, or noticing blood in his stool or urine. States each prescription is $300 for a 3 month supply which is getting hard for him to afford.     He acknowledges that his blood pressure is elevated but does not monitor it at home.    He is seeking a sleep aid that is compatible with his diabetes management. He has heard that melatonin may interfere with some diabetes medications. His primary issue is maintaining sleep, rather than falling asleep.    He is requesting a recommendation for over-the-counter allergy eye drops.    Patient's PMR from outside medical facility reviewed and noted.    Review of Systems   Constitutional:  Negative for fatigue.   HENT:  Negative for congestion.    Eyes:  Positive for itching.   Respiratory:  Negative for shortness of breath.    Cardiovascular:  Negative for chest pain.   Gastrointestinal:  Negative for abdominal pain.   Genitourinary:  Negative for hematuria.   Psychiatric/Behavioral:  Positive for sleep disturbance.         Otherwise complete ROS reviewed and negative except as mentioned in the HPI.    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 He has never been exposed to tobacco smoke. He has never used smokeless tobacco. He reports current alcohol use. He reports that he does not use drugs.    Family History: family history includes Alzheimer's disease in his father; Anxiety disorder in his mother; Depression in his mother; Diabetes in his mother, sister, and sister; Hyperlipidemia in his mother; Hypertension in his mother.      Allergies:  Allergies   Allergen Reactions    Penicillins GI Intolerance     Medications:  Prior to Admission medications    Medication Sig Start Date End Date Taking? Authorizing Provider   amLODIPine (NORVASC) 2.5 MG tablet Take 1 tablet by mouth Daily. 4/15/24  Yes Jefry Blackman MD   Continuous Glucose Sensor (Dexcom G6 Sensor) Use Every 10 (Ten) Days. 7/9/24  Yes Jefry Blackman MD   Continuous Glucose Transmitter (Dexcom G6 Transmitter) misc Use 1 each Daily. 7/9/24  Yes Jefry Blackman MD   Insulin Glargine, 1 Unit Dial, (TOUJEO) 300 UNIT/ML solution pen-injector injection Inject 60 Units under the skin into the appropriate area as directed Every Morning. 4/15/24  Yes Jefry Blackman MD   Insulin Lispro, 1 Unit Dial, (HumaLOG KwikPen) 100 UNIT/ML solution pen-injector Inject 8 Units under the skin into the appropriate area as directed 3 (Three) Times a Day. Patient utilizes sliding scale to determine insulin administration 11/6/23  Yes July Johnson APRN   Insulin Pen Needle (Pen Needles) 32G X 6 MM misc    Yes Provider, MD Althea   lisinopril (PRINIVIL,ZESTRIL) 20 MG tablet Take 1 tablet by mouth Daily. 4/22/24  Yes July Johnson APRN   metFORMIN (GLUCOPHAGE) 1000 MG tablet Take 1 tablet by mouth 2 (Two) Times a Day With Meals. 6/13/24  Yes Jefry Blackman MD   Omega-3  Fatty Acids (fish oil) 1000 MG capsule capsule Take 2 capsules by mouth Daily With Breakfast. 6/16/23  Yes July Johnson APRN   omeprazole (priLOSEC) 20 MG capsule Take 1 capsule by mouth Daily. 6/16/23  Yes July Johnson APRN   sildenafil (REVATIO) 20 MG tablet Take 1 tablet by mouth Daily. 2/19/24  Yes Devin De La Cruz APRN   SUMAtriptan (Imitrex) 50 MG tablet Take one tablet at onset of headache. May repeat dose one time in 2 hours if headache not relieved. 2/23/24  Yes Devin De La Cruz APRN   Tirzepatide (MOUNJARO) 2.5 MG/0.5ML solution pen-injector pen Inject 0.5 mL under the skin into the appropriate area as directed 1 (One) Time Per Week. 6/13/24  Yes Jefry Blackman MD   triamcinolone (KENALOG) 0.1 % cream Apply  topically to the appropriate area as directed 2 (Two) Times a Day. 8/8/24  Yes Jefry Blackman MD   vitamin D (ERGOCALCIFEROL) 1.25 MG (50319 UT) capsule capsule Take 1 capsule by mouth 1 (One) Time Per Week. 1/25/24  Yes Devin De La Cruz APRN   metFORMIN (GLUCOPHAGE) 1000 MG tablet Take 1 tablet by mouth 2 (Two) Times a Day With Meals for 30 days. 6/13/24 7/13/24  Jefry Blackman MD       EVELYNE:        PHQ-9 Depression Screening  Little interest or pleasure in doing things?     Feeling down, depressed, or hopeless?     Trouble falling or staying asleep, or sleeping too much?     Feeling tired or having little energy?     Poor appetite or overeating?     Feeling bad about yourself - or that you are a failure or have let yourself or your family down?     Trouble concentrating on things, such as reading the newspaper or watching television?     Moving or speaking so slowly that other people could have noticed? Or the opposite - being so fidgety or restless that you have been moving around a lot more than usual?     Thoughts that you would be better off dead, or of hurting yourself in some way?     PHQ-9 Total Score     If you checked off any problems, how  "difficult have these problems made it for you to do your work, take care of things at home, or get along with other people?           Objective     Vital Signs: /80 (BP Location: Left arm, Patient Position: Sitting, Cuff Size: Adult)   Pulse 73   Resp 18   Ht 182.9 cm (72\")   Wt 89.4 kg (197 lb)   SpO2 97%   BMI 26.72 kg/m²   Physical Exam  Vitals and nursing note reviewed.   Constitutional:       General: He is not in acute distress.     Appearance: Normal appearance. He is not ill-appearing.   HENT:      Head: Normocephalic and atraumatic.      Right Ear: External ear normal.      Left Ear: External ear normal.      Nose: Nose normal.      Mouth/Throat:      Mouth: Mucous membranes are moist.      Pharynx: No posterior oropharyngeal erythema.   Eyes:      General: No scleral icterus.     Extraocular Movements: Extraocular movements intact.      Conjunctiva/sclera: Conjunctivae normal.      Pupils: Pupils are equal, round, and reactive to light.   Cardiovascular:      Rate and Rhythm: Normal rate and regular rhythm.      Pulses: Normal pulses.      Heart sounds: Normal heart sounds.   Pulmonary:      Effort: Pulmonary effort is normal. No respiratory distress.      Breath sounds: Normal breath sounds. No wheezing.   Abdominal:      General: Abdomen is flat. Bowel sounds are normal.      Palpations: Abdomen is soft.      Tenderness: There is no abdominal tenderness.   Musculoskeletal:         General: Normal range of motion.      Cervical back: Normal range of motion.      Right lower leg: No edema.      Left lower leg: No edema.   Skin:     General: Skin is warm and dry.      Findings: No erythema or rash.   Neurological:      General: No focal deficit present.      Mental Status: He is alert and oriented to person, place, and time. Mental status is at baseline.      Motor: No weakness.   Psychiatric:         Mood and Affect: Mood normal.         Behavior: Behavior normal.         Thought Content: " Thought content normal.         Judgment: Judgment normal.              Advance Care Planning   ACP discussion was held with the patient during this visit.         Results Reviewed:  Glucose   Date Value Ref Range Status   06/14/2024 241 (H) 70 - 99 mg/dL Final     BUN   Date Value Ref Range Status   06/14/2024 15 6 - 24 mg/dL Final     Creatinine   Date Value Ref Range Status   06/14/2024 0.95 0.76 - 1.27 mg/dL Final     Sodium   Date Value Ref Range Status   06/14/2024 139 134 - 144 mmol/L Final     Potassium   Date Value Ref Range Status   06/14/2024 4.6 3.5 - 5.2 mmol/L Final     Chloride   Date Value Ref Range Status   06/14/2024 106 96 - 106 mmol/L Final     Total CO2   Date Value Ref Range Status   06/14/2024 18 (L) 20 - 29 mmol/L Final     Calcium   Date Value Ref Range Status   06/14/2024 9.1 8.7 - 10.2 mg/dL Final     ALT (SGPT)   Date Value Ref Range Status   06/14/2024 21 0 - 44 IU/L Final     AST (SGOT)   Date Value Ref Range Status   06/14/2024 18 0 - 40 IU/L Final     WBC   Date Value Ref Range Status   06/14/2024 6.6 3.4 - 10.8 x10E3/uL Final     Hematocrit   Date Value Ref Range Status   06/14/2024 41.4 37.5 - 51.0 % Final     Platelets   Date Value Ref Range Status   06/14/2024 275 150 - 450 x10E3/uL Final     Triglycerides   Date Value Ref Range Status   12/08/2023 77 0 - 149 mg/dL Final     HDL Cholesterol   Date Value Ref Range Status   12/08/2023 45 >39 mg/dL Final     LDL Chol Calc (NIH)   Date Value Ref Range Status   12/08/2023 87 0 - 99 mg/dL Final     Hemoglobin A1C   Date Value Ref Range Status   06/14/2024 7.7 (H) 4.8 - 5.6 % Final     Comment:              Prediabetes: 5.7 - 6.4           Diabetes: >6.4           Glycemic control for adults with diabetes: <7.0     03/20/2024 7.7 (A) 4.5 - 5.7 % Final         Assessment / Plan     Assessment/Plan:    1. Type 2 diabetes mellitus with hypoglycemia without coma, with long-term current use of insulin  - Tirzepatide (MOUNJARO) 2.5 MG/0.5ML  solution pen-injector pen; Inject 0.5 mL under the skin into the appropriate area as directed 1 (One) Time Per Week.  Dispense: 2 mL; Refill: 11  - insulin glargine (LANTUS, SEMGLEE) 100 UNIT/ML injection; Inject 60 Units under the skin into the appropriate area as directed Daily.  Dispense: 10 mL; Refill: 12  - Insulin Lispro (HumaLOG) 100 UNIT/ML injection; Inject 8 Units under the skin into the appropriate area as directed 3 (Three) Times a Day Before Meals.  Dispense: 10 mL; Refill: 12  - Insulin Pen Needle (Pen Needles) 32G X 6 MM misc; Use 1 each 4 (Four) Times a Day.  Dispense: 100 each; Refill: 12  - CBC w AUTO Differential  - Comprehensive metabolic panel  - Hemoglobin A1c  - Microalbumin / Creatinine Urine Ratio - Urine, Clean Catch    2. High triglycerides  - Lipid panel    3. Sleep disturbances  - traZODone (DESYREL) 50 MG tablet; Take 1 tablet by mouth Every Night.  Dispense: 30 tablet; Refill: 0    Diagnoses and all orders for this visit:    1. High triglycerides (Primary)  -     Cancel: Lipid panel  -     Lipid panel    2. Type 2 diabetes mellitus with hypoglycemia without coma, with long-term current use of insulin  -     Cancel: Microalbumin / Creatinine Urine Ratio - Urine, Clean Catch  -     Tirzepatide (MOUNJARO) 2.5 MG/0.5ML solution pen-injector pen; Inject 0.5 mL under the skin into the appropriate area as directed 1 (One) Time Per Week.  Dispense: 2 mL; Refill: 11  -     Cancel: CBC w AUTO Differential  -     Cancel: Comprehensive metabolic panel  -     Cancel: Hemoglobin A1c  -     insulin glargine (LANTUS, SEMGLEE) 100 UNIT/ML injection; Inject 60 Units under the skin into the appropriate area as directed Daily.  Dispense: 10 mL; Refill: 12  -     Insulin Lispro (HumaLOG) 100 UNIT/ML injection; Inject 8 Units under the skin into the appropriate area as directed 3 (Three) Times a Day Before Meals.  Dispense: 10 mL; Refill: 12  -     Insulin Pen Needle (Pen Needles) 32G X 6 MM misc; Use 1  each 4 (Four) Times a Day.  Dispense: 100 each; Refill: 12  -     CBC w AUTO Differential  -     Comprehensive metabolic panel  -     Hemoglobin A1c  -     Microalbumin / Creatinine Urine Ratio - Urine, Clean Catch    3. Sleep disturbances  -     traZODone (DESYREL) 50 MG tablet; Take 1 tablet by mouth Every Night.  Dispense: 30 tablet; Refill: 0        Assessment & Plan  1. Diabetes Mellitus.  His last recorded A1c level was 7.7, indicating a potential worsening of his condition. He is currently on Toujeo and metformin. A prescription for Mounjaro will be sent to [facility], and the prior authorization process will be managed by the office. He is advised to switch from Toujeo to glargine, starting with 20 units every morning and adjusting by 5 to 10 units daily based on his body's response. Humalog will be switched to a drawn-up form to potentially reduce costs. Blood work will be conducted today to assess his A1c levels. If the Mounjaro is not approved, alternative options will be considered.    2. Hypertension.  He has been advised to monitor his blood pressure at home several times daily and to provide the readings via [employer] message early next week.    3. Insomnia.  A prescription for low-dose trazodone 50 mg will be sent to [facility]. If necessary, the dosage can be increased to 100 mg, but he should inform us before doing so.    4. Allergic Conjunctivitis.  Pataday eyedrops have been recommended for his condition.      No follow-ups on file. unless patient needs to be seen sooner or acute issues arise.      I have discussed the patient results/orders and and plan/recommendation with them at today's visit.    Patient or patient representative verbalized consent for the use of Ambient Listening during the visit with  RADHA Mccann for chart documentation. 10/3/2024  16:19 CDT  RADHA Mccann   10/03/2024

## 2024-10-05 LAB
ALBUMIN SERPL-MCNC: 4 G/DL (ref 3.8–4.9)
ALP SERPL-CCNC: 86 IU/L (ref 44–121)
ALT SERPL-CCNC: 25 IU/L (ref 0–44)
AST SERPL-CCNC: 19 IU/L (ref 0–40)
BASOPHILS # BLD AUTO: 0.1 X10E3/UL (ref 0–0.2)
BASOPHILS NFR BLD AUTO: 1 %
BILIRUB SERPL-MCNC: 0.3 MG/DL (ref 0–1.2)
BUN SERPL-MCNC: 19 MG/DL (ref 6–24)
BUN/CREAT SERPL: 16 (ref 9–20)
CALCIUM SERPL-MCNC: 9.4 MG/DL (ref 8.7–10.2)
CHLORIDE SERPL-SCNC: 103 MMOL/L (ref 96–106)
CHOLEST SERPL-MCNC: 177 MG/DL (ref 100–199)
CO2 SERPL-SCNC: 23 MMOL/L (ref 20–29)
CREAT SERPL-MCNC: 1.16 MG/DL (ref 0.76–1.27)
EGFRCR SERPLBLD CKD-EPI 2021: 75 ML/MIN/1.73
EOSINOPHIL # BLD AUTO: 0.7 X10E3/UL (ref 0–0.4)
EOSINOPHIL NFR BLD AUTO: 9 %
ERYTHROCYTE [DISTWIDTH] IN BLOOD BY AUTOMATED COUNT: 12.4 % (ref 11.6–15.4)
GLOBULIN SER CALC-MCNC: 2.1 G/DL (ref 1.5–4.5)
GLUCOSE SERPL-MCNC: 166 MG/DL (ref 70–99)
HBA1C MFR BLD: 9 % (ref 4.8–5.6)
HCT VFR BLD AUTO: 45.5 % (ref 37.5–51)
HDLC SERPL-MCNC: 49 MG/DL
HGB BLD-MCNC: 15.1 G/DL (ref 13–17.7)
IMM GRANULOCYTES # BLD AUTO: 0.1 X10E3/UL (ref 0–0.1)
IMM GRANULOCYTES NFR BLD AUTO: 1 %
LDLC SERPL CALC-MCNC: 108 MG/DL (ref 0–99)
LYMPHOCYTES # BLD AUTO: 2.8 X10E3/UL (ref 0.7–3.1)
LYMPHOCYTES NFR BLD AUTO: 36 %
MCH RBC QN AUTO: 31 PG (ref 26.6–33)
MCHC RBC AUTO-ENTMCNC: 33.2 G/DL (ref 31.5–35.7)
MCV RBC AUTO: 93 FL (ref 79–97)
MONOCYTES # BLD AUTO: 0.7 X10E3/UL (ref 0.1–0.9)
MONOCYTES NFR BLD AUTO: 8 %
NEUTROPHILS # BLD AUTO: 3.5 X10E3/UL (ref 1.4–7)
NEUTROPHILS NFR BLD AUTO: 45 %
PLATELET # BLD AUTO: 305 X10E3/UL (ref 150–450)
POTASSIUM SERPL-SCNC: 4.9 MMOL/L (ref 3.5–5.2)
PROT SERPL-MCNC: 6.1 G/DL (ref 6–8.5)
RBC # BLD AUTO: 4.87 X10E6/UL (ref 4.14–5.8)
SODIUM SERPL-SCNC: 141 MMOL/L (ref 134–144)
TRIGL SERPL-MCNC: 109 MG/DL (ref 0–149)
VLDLC SERPL CALC-MCNC: 20 MG/DL (ref 5–40)
WBC # BLD AUTO: 7.8 X10E3/UL (ref 3.4–10.8)

## 2024-10-08 RX ORDER — SILDENAFIL CITRATE 20 MG/1
20 TABLET ORAL DAILY
Qty: 30 TABLET | Refills: 0 | Status: SHIPPED | OUTPATIENT
Start: 2024-10-08

## 2024-10-08 NOTE — TELEPHONE ENCOUNTER
Rx Refill Note  Requested Prescriptions     Pending Prescriptions Disp Refills    sildenafil (REVATIO) 20 MG tablet 30 tablet 0     Sig: Take 1 tablet by mouth Daily.      Last office visit with prescribing clinician: 3/20/2024   Last telemedicine visit with prescribing clinician: Visit date not found   Next office visit with prescribing clinician: Visit date not found                         Would you like a call back once the refill request has been completed: [] Yes [] No    If the office needs to give you a call back, can they leave a voicemail: [] Yes [] No    Kelley De La Cruz RN  10/08/24, 07:08 CDT

## 2024-10-10 DIAGNOSIS — I10 HYPERTENSION, UNSPECIFIED TYPE: ICD-10-CM

## 2024-10-10 RX ORDER — LISINOPRIL 20 MG/1
20 TABLET ORAL DAILY
Qty: 90 TABLET | Refills: 3 | Status: SHIPPED | OUTPATIENT
Start: 2024-10-10

## 2024-12-10 ENCOUNTER — OFFICE VISIT (OUTPATIENT)
Dept: INTERNAL MEDICINE | Facility: CLINIC | Age: 54
End: 2024-12-10
Payer: COMMERCIAL

## 2024-12-10 VITALS
RESPIRATION RATE: 17 BRPM | SYSTOLIC BLOOD PRESSURE: 174 MMHG | HEART RATE: 81 BPM | OXYGEN SATURATION: 98 % | DIASTOLIC BLOOD PRESSURE: 84 MMHG | WEIGHT: 197 LBS | HEIGHT: 72 IN | BODY MASS INDEX: 26.68 KG/M2

## 2024-12-10 DIAGNOSIS — I10 HYPERTENSION, UNSPECIFIED TYPE: ICD-10-CM

## 2024-12-10 DIAGNOSIS — F32.1 MODERATE MAJOR DEPRESSION: Primary | ICD-10-CM

## 2024-12-10 PROCEDURE — 99214 OFFICE O/P EST MOD 30 MIN: CPT

## 2024-12-10 RX ORDER — LISINOPRIL 40 MG/1
40 TABLET ORAL DAILY
Qty: 60 TABLET | Refills: 0 | Status: SHIPPED | OUTPATIENT
Start: 2024-12-10

## 2024-12-10 RX ORDER — BUPROPION HYDROCHLORIDE 150 MG/1
150 TABLET ORAL DAILY
Qty: 30 TABLET | Refills: 0 | Status: SHIPPED | OUTPATIENT
Start: 2024-12-10

## 2024-12-10 NOTE — PROGRESS NOTES
Subjective     Chief Complaint   Patient presents with    Anxiety     Increasing in severity for 2-3 weeks.        Anxiety   Symptoms include decreased concentration and nervous/anxious behavior.  Patient reports no chest pain, confusion, dizziness, nausea, palpitations, shortness of breath or suicidal ideas.     History of Present Illness  The patient presents for evaluation of anxiety and elevated blood pressure.    He reports a significant increase in anxiety following his wife's departure to her sister's residence, which he estimates occupies 75% to 80% of his daily thoughts. He is uncertain about the presence of depressive symptoms, as he has no prior experience with depression. He recalls that his wife began exhibiting signs of distress and disconnection around the same time last year, which led to their engagement in counseling at the beginning of the year.   He finds mornings particularly challenging, with difficulty in getting out of bed and commuting to work. He reports feeling down and fearful on several days, but does not endorse feelings of hopelessness. He experiences fatigue and low energy levels more than usual, necessitating daily naps. He also reports a poor appetite, particularly when alone. He has a scheduled therapy appointment tomorrow. He reports feeling competent but occasionally experiences feelings of failure and self-blame. He reports difficulty concentrating and no thoughts of self-harm. He finds work and home responsibilities somewhat challenging. His sleep pattern is irregular, often requiring medication to achieve 4 to 6 hours of sleep.    He has noticed an increase in his blood pressure during this visit. He is currently on lisinopril 20 mg and amlodipine 2.5 mg.    FAMILY HISTORY  The patient's wife, her father, brother, sister, and aunts have mental health issues.    MEDICATIONS  Current: lisinopril, amlodipine    Patient's PMR from outside medical facility reviewed and  noted.    Review of Systems   Respiratory:  Negative for shortness of breath.    Cardiovascular:  Negative for chest pain and palpitations.   Gastrointestinal:  Negative for nausea.   Neurological:  Negative for dizziness.   Psychiatric/Behavioral:  Positive for decreased concentration, dysphoric mood and sleep disturbance. Negative for confusion, self-injury and suicidal ideas. The patient is nervous/anxious.         Otherwise complete ROS reviewed and negative except as mentioned in the HPI.    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 He has never been exposed to tobacco smoke. He has never used smokeless tobacco. He reports current alcohol use. He reports that he does not use drugs.    Family History: family history includes Alzheimer's disease in his father; Anxiety disorder in his mother; Depression in his mother; Diabetes in his mother, sister, and sister; Hyperlipidemia in his mother; Hypertension in his mother.      Allergies:  Allergies   Allergen Reactions    Penicillins GI Intolerance     Medications:  Prior to Admission medications    Medication Sig Start Date End Date Taking? Authorizing Provider   amLODIPine (NORVASC) 2.5 MG tablet Take 1 tablet by mouth Daily. 4/15/24  Yes Jefry Blackman MD   Continuous Glucose Sensor (Dexcom G6 Sensor) Use Every 10 (Ten) Days. 7/9/24  Yes Jefry Blackman MD   Continuous Glucose Transmitter (Dexcom G6 Transmitter) misc Use 1 each Daily. 7/9/24  Yes Jefry Blackman MD   insulin glargine (LANTUS, SEMGLEE) 100 UNIT/ML injection Inject 60 Units under the skin into the appropriate area as directed Daily. 10/3/24  Yes July Johnson  RADHA   Insulin Glargine, 1 Unit Dial, (TOUJEO) 300 UNIT/ML solution pen-injector injection Inject 60 Units under the skin into the appropriate area as directed Every Morning. 4/15/24  Yes Jefry Blackman MD   Insulin Lispro (HumaLOG) 100 UNIT/ML injection Inject 8 Units under the skin into the appropriate area as directed 3 (Three) Times a Day Before Meals. 10/3/24  Yes July Johnson APRN   Insulin Lispro, 1 Unit Dial, (HumaLOG KwikPen) 100 UNIT/ML solution pen-injector Inject 8 Units under the skin into the appropriate area as directed 3 (Three) Times a Day. Patient utilizes sliding scale to determine insulin administration 11/6/23  Yes July Johnson APRN   Insulin Pen Needle (Pen Needles) 32G X 6 MM misc Use 1 each 4 (Four) Times a Day. 10/3/24  Yes July Johnson APRN   lisinopril (PRINIVIL,ZESTRIL) 20 MG tablet Take 1 tablet by mouth Daily. 10/10/24  Yes July Johnson APRN   metFORMIN (GLUCOPHAGE) 1000 MG tablet Take 1 tablet by mouth 2 (Two) Times a Day With Meals. 6/13/24  Yes Jefry Blackman MD   Omega-3 Fatty Acids (fish oil) 1000 MG capsule capsule Take 2 capsules by mouth Daily With Breakfast. 6/16/23  Yes July Johnson APRN   omeprazole (priLOSEC) 20 MG capsule Take 1 capsule by mouth Daily. 6/16/23  Yes July Johnson APRN   sildenafil (REVATIO) 20 MG tablet Take 1 tablet by mouth Daily. 10/8/24  Yes July Johnson APRN   SUMAtriptan (Imitrex) 50 MG tablet Take one tablet at onset of headache. May repeat dose one time in 2 hours if headache not relieved. 2/23/24  Yes Devin De La Cruz APRN   Tirzepatide (MOUNJARO) 2.5 MG/0.5ML solution pen-injector pen Inject 0.5 mL under the skin into the appropriate area as directed 1 (One) Time Per Week. 10/3/24  Yes July Jonhson APRN   traZODone (DESYREL) 50 MG tablet Take 1 tablet by mouth Every Night. 10/3/24  Yes July Johnson APRN   triamcinolone (KENALOG) 0.1 % cream Apply  topically to the appropriate area  "as directed 2 (Two) Times a Day. 8/8/24  Yes Jefry Blackman MD   vitamin D (ERGOCALCIFEROL) 1.25 MG (72519 UT) capsule capsule Take 1 capsule by mouth 1 (One) Time Per Week. 1/25/24  Yes Devin De La Cruz APRN   metFORMIN (GLUCOPHAGE) 1000 MG tablet Take 1 tablet by mouth 2 (Two) Times a Day With Meals for 30 days. 6/13/24 7/13/24  Jefry Blackman MD       EVELYNE:        PHQ-9 Depression Screening  Little interest or pleasure in doing things? Several days   Feeling down, depressed, or hopeless? Over half   PHQ-2 Total Score 3   Trouble falling or staying asleep, or sleeping too much? Several days   Feeling tired or having little energy? Several days   Poor appetite or overeating? Several days   Feeling bad about yourself - or that you are a failure or have let yourself or your family down? Several days   Trouble concentrating on things, such as reading the newspaper or watching television? Almost all   Moving or speaking so slowly that other people could have noticed? Or the opposite - being so fidgety or restless that you have been moving around a lot more than usual? Not at all   Thoughts that you would be better off dead, or of hurting yourself in some way? Not at all   PHQ-9 Total Score 10   If you checked off any problems, how difficult have these problems made it for you to do your work, take care of things at home, or get along with other people? Somewhat difficult       PHQ-9 Total Score: 10   10-14 (Moderate Depression)  Support given, observe for worsening symptoms, Recommended starting psychotherapy/counseling, and Recommended continuing psychotherapy/counseling    Objective     Vital Signs: /84 (BP Location: Left arm, Patient Position: Sitting, Cuff Size: Adult)   Pulse 81   Resp 17   Ht 182.9 cm (72\")   Wt 89.4 kg (197 lb)   SpO2 98%   BMI 26.72 kg/m²   Physical Exam  Vitals and nursing note reviewed.   Constitutional:       General: He is not in acute distress.     " Appearance: Normal appearance. He is not ill-appearing.   HENT:      Head: Normocephalic and atraumatic.      Right Ear: External ear normal.      Left Ear: External ear normal.      Nose: Nose normal.      Mouth/Throat:      Mouth: Mucous membranes are moist.      Pharynx: No posterior oropharyngeal erythema.   Eyes:      General: No scleral icterus.     Extraocular Movements: Extraocular movements intact.      Conjunctiva/sclera: Conjunctivae normal.      Pupils: Pupils are equal, round, and reactive to light.   Cardiovascular:      Rate and Rhythm: Normal rate and regular rhythm.      Pulses: Normal pulses.      Heart sounds: Normal heart sounds.   Pulmonary:      Effort: Pulmonary effort is normal. No respiratory distress.      Breath sounds: Normal breath sounds. No wheezing.   Abdominal:      General: Abdomen is flat. Bowel sounds are normal.      Palpations: Abdomen is soft.      Tenderness: There is no abdominal tenderness.   Musculoskeletal:         General: Normal range of motion.      Cervical back: Normal range of motion.      Right lower leg: No edema.      Left lower leg: No edema.   Skin:     General: Skin is warm and dry.      Findings: No erythema or rash.   Neurological:      General: No focal deficit present.      Mental Status: He is alert and oriented to person, place, and time. Mental status is at baseline.      Motor: No weakness.   Psychiatric:         Mood and Affect: Mood normal.         Behavior: Behavior normal.         Thought Content: Thought content normal.         Judgment: Judgment normal.      Comments: Tearful during visit                Advance Care Planning   ACP discussion was held with the patient during this visit.         Results Reviewed:  Glucose   Date Value Ref Range Status   10/04/2024 166 (H) 70 - 99 mg/dL Final     BUN   Date Value Ref Range Status   10/04/2024 19 6 - 24 mg/dL Final     Creatinine   Date Value Ref Range Status   10/04/2024 1.16 0.76 - 1.27 mg/dL Final      Sodium   Date Value Ref Range Status   10/04/2024 141 134 - 144 mmol/L Final     Potassium   Date Value Ref Range Status   10/04/2024 4.9 3.5 - 5.2 mmol/L Final     Chloride   Date Value Ref Range Status   10/04/2024 103 96 - 106 mmol/L Final     Total CO2   Date Value Ref Range Status   10/04/2024 23 20 - 29 mmol/L Final     Calcium   Date Value Ref Range Status   10/04/2024 9.4 8.7 - 10.2 mg/dL Final     ALT (SGPT)   Date Value Ref Range Status   10/04/2024 25 0 - 44 IU/L Final     AST (SGOT)   Date Value Ref Range Status   10/04/2024 19 0 - 40 IU/L Final     WBC   Date Value Ref Range Status   10/04/2024 7.8 3.4 - 10.8 x10E3/uL Final     Hematocrit   Date Value Ref Range Status   10/04/2024 45.5 37.5 - 51.0 % Final     Platelets   Date Value Ref Range Status   10/04/2024 305 150 - 450 x10E3/uL Final     Triglycerides   Date Value Ref Range Status   10/04/2024 109 0 - 149 mg/dL Final     HDL Cholesterol   Date Value Ref Range Status   10/04/2024 49 >39 mg/dL Final     LDL Chol Calc (NIH)   Date Value Ref Range Status   10/04/2024 108 (H) 0 - 99 mg/dL Final     Hemoglobin A1C   Date Value Ref Range Status   10/04/2024 9.0 (H) 4.8 - 5.6 % Final     Comment:              Prediabetes: 5.7 - 6.4           Diabetes: >6.4           Glycemic control for adults with diabetes: <7.0     03/20/2024 7.7 (A) 4.5 - 5.7 % Final         Assessment / Plan     Assessment/Plan:    1. Moderate major depression  - buPROPion XL (Wellbutrin XL) 150 MG 24 hr tablet; Take 1 tablet by mouth Daily.  Dispense: 30 tablet; Refill: 0    2. Hypertension, unspecified type  - lisinopril (PRINIVIL,ZESTRIL) 40 MG tablet; Take 1 tablet by mouth Daily.  Dispense: 60 tablet; Refill: 0    Diagnoses and all orders for this visit:    1. Moderate major depression (Primary)  -     buPROPion XL (Wellbutrin XL) 150 MG 24 hr tablet; Take 1 tablet by mouth Daily.  Dispense: 30 tablet; Refill: 0    2. Hypertension, unspecified type  -     lisinopril  (PRINIVIL,ZESTRIL) 40 MG tablet; Take 1 tablet by mouth Daily.  Dispense: 60 tablet; Refill: 0        Assessment & Plan  1. Anxiety.  He reports significant anxiety consuming 75% to 80% of his day, difficulty sleeping, and increased struggle with daily activities. Depression screening indicates moderate depression. Wellbutrin 150 mg once daily has been prescribed to manage his symptoms. He is advised to send a [employer] message in a week to report his response to the medication. If there is no improvement, the dosage may be increased to 300 mg, and an additional antidepressant such as Zoloft or Lexapro may be considered.    2. Elevated blood pressure.  His blood pressure remains elevated despite current medication. The lisinopril dosage has been increased from 20 mg to 40 mg. He is advised to monitor his blood pressure twice daily for the next four days and send a [employer] message on Friday to report his readings. Potential side effects of lisinopril, including dry cough and dizziness, were discussed.    Follow-up  The patient will follow up in 3 weeks.    Return for Next scheduled follow up. unless patient needs to be seen sooner or acute issues arise.      I have discussed the patient results/orders and and plan/recommendation with them at today's visit.    Patient or patient representative verbalized consent for the use of Ambient Listening during the visit with  RADHA Mccann for chart documentation. 12/10/2024  09:12 CST  RADHA Mccann   12/10/2024

## 2025-01-03 ENCOUNTER — OFFICE VISIT (OUTPATIENT)
Dept: INTERNAL MEDICINE | Facility: CLINIC | Age: 55
End: 2025-01-03
Payer: COMMERCIAL

## 2025-01-03 VITALS
BODY MASS INDEX: 26.82 KG/M2 | HEART RATE: 75 BPM | DIASTOLIC BLOOD PRESSURE: 77 MMHG | OXYGEN SATURATION: 98 % | SYSTOLIC BLOOD PRESSURE: 152 MMHG | RESPIRATION RATE: 17 BRPM | HEIGHT: 72 IN | WEIGHT: 198 LBS

## 2025-01-03 DIAGNOSIS — E11.65 TYPE 2 DIABETES MELLITUS WITH HYPERGLYCEMIA, WITH LONG-TERM CURRENT USE OF INSULIN: ICD-10-CM

## 2025-01-03 DIAGNOSIS — E11.649 TYPE 2 DIABETES MELLITUS WITH HYPOGLYCEMIA WITHOUT COMA, WITH LONG-TERM CURRENT USE OF INSULIN: ICD-10-CM

## 2025-01-03 DIAGNOSIS — Z79.4 TYPE 2 DIABETES MELLITUS WITH HYPERGLYCEMIA, WITH LONG-TERM CURRENT USE OF INSULIN: ICD-10-CM

## 2025-01-03 DIAGNOSIS — F41.9 ANXIETY: ICD-10-CM

## 2025-01-03 DIAGNOSIS — F32.1 MODERATE MAJOR DEPRESSION: ICD-10-CM

## 2025-01-03 DIAGNOSIS — Z79.4 TYPE 2 DIABETES MELLITUS WITH HYPOGLYCEMIA WITHOUT COMA, WITH LONG-TERM CURRENT USE OF INSULIN: ICD-10-CM

## 2025-01-03 DIAGNOSIS — I10 HYPERTENSION, UNSPECIFIED TYPE: Primary | ICD-10-CM

## 2025-01-03 LAB
EXPIRATION DATE: ABNORMAL
HBA1C MFR BLD: 7.5 % (ref 4.5–5.7)
Lab: ABNORMAL

## 2025-01-03 PROCEDURE — 83036 HEMOGLOBIN GLYCOSYLATED A1C: CPT

## 2025-01-03 PROCEDURE — 99214 OFFICE O/P EST MOD 30 MIN: CPT

## 2025-01-03 RX ORDER — BUSPIRONE HYDROCHLORIDE 10 MG/1
10 TABLET ORAL 2 TIMES DAILY
Qty: 60 TABLET | Refills: 0 | Status: SHIPPED | OUTPATIENT
Start: 2025-01-03

## 2025-01-03 RX ORDER — BUPROPION HYDROCHLORIDE 300 MG/1
300 TABLET ORAL DAILY
Qty: 90 TABLET | Refills: 1 | Status: SHIPPED | OUTPATIENT
Start: 2025-01-03

## 2025-01-03 RX ORDER — INSULIN LISPRO 100 [IU]/ML
8 INJECTION, SOLUTION INTRAVENOUS; SUBCUTANEOUS
Qty: 10 ML | Refills: 12 | Status: SHIPPED | OUTPATIENT
Start: 2025-01-03

## 2025-01-03 RX ORDER — BLOOD SUGAR DIAGNOSTIC
1 STRIP MISCELLANEOUS 4 TIMES DAILY
Qty: 300 EACH | Refills: 12 | Status: SHIPPED | OUTPATIENT
Start: 2025-01-03

## 2025-01-03 NOTE — PROGRESS NOTES
Subjective     Chief Complaint   Patient presents with    Diabetes     Follow up.     Hypertension       Diabetes    Hypertension      History of Present Illness  The patient presents for evaluation of hypertension, diabetes mellitus, anxiety, and health maintenance.    He reports persistent hypertension despite being on a regimen of amlodipine 2.5 mg. He experienced transient orthostatic hypotension when transitioning from a bent to standing position, but this symptom has since resolved.    He has been monitoring his blood glucose levels at home using a Dexcom device, which occasionally indicates hypoglycemia. He is currently on Humalog 8 units and Lantus 60 units, although he has recently reduced his Lantus dosage to 40 units. He attributes this reduction to a decrease in his consumption of unhealthy food. He also reports that his daughter has been encouraging him to maintain a healthier diet during the holiday season. He does not experience any peripheral neuropathy or foot ulcers. He admits to inadequate hydration, which he believes may be contributing to nocturnal leg cramps. He is considering discontinuing the use of his Dexcom device due to its cost and is uncertain about his insurance coverage for a glucometer.    He reports no significant improvement in his anxiety symptoms with Wellbutrin, although he acknowledges some good days. He has been seeking support from his siblings, eldest son, and a coworker, which he finds beneficial. He experiences heightened anxiety in response to work-related stressors and frequently second-guesses his decisions.  He has not yet received his influenza vaccine and typically experiences a low-grade fever post-vaccination. He plans to receive the vaccine on Monday.    MEDICATIONS  Current: Amlodipine, Humalog, Lantus, Wellbutrin, BuSpar    Patient's PMR from outside medical facility reviewed and noted.    Review of Systems     Otherwise complete ROS reviewed and  negative except as mentioned in the HPI.    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 He has never been exposed to tobacco smoke. He has never used smokeless tobacco. He reports current alcohol use. He reports that he does not use drugs.    Family History: family history includes Alzheimer's disease in his father; Anxiety disorder in his mother; Depression in his mother; Diabetes in his mother, sister, and sister; Hyperlipidemia in his mother; Hypertension in his mother.      Allergies:  Allergies   Allergen Reactions    Penicillins GI Intolerance     Medications:  Prior to Admission medications    Medication Sig Start Date End Date Taking? Authorizing Provider   amLODIPine (NORVASC) 2.5 MG tablet Take 1 tablet by mouth Daily. 4/15/24  Yes Jefry Blackman MD   buPROPion XL (Wellbutrin XL) 150 MG 24 hr tablet Take 1 tablet by mouth Daily. 12/10/24  Yes July Johnson APRN   Continuous Glucose Sensor (Dexcom G6 Sensor) Use Every 10 (Ten) Days. 7/9/24  Yes Jefry Blackman MD   Continuous Glucose Transmitter (Dexcom G6 Transmitter) misc Use 1 each Daily. 7/9/24  Yes Jefry Blackman MD   insulin glargine (LANTUS, SEMGLEE) 100 UNIT/ML injection Inject 60 Units under the skin into the appropriate area as directed Daily. 10/3/24  Yes July Johnson APRN   Insulin Glargine, 1 Unit Dial, (TOUJEO) 300 UNIT/ML solution pen-injector injection Inject 60 Units under the skin into the appropriate area as directed Every Morning. 4/15/24  Yes Jefry Blackman MD   Insulin Lispro (HumaLOG) 100 UNIT/ML injection Inject 8 Units under the skin into the appropriate area as directed 3 (Three)  Times a Day Before Meals. 10/3/24  Yes July Johnson APRN   Insulin Lispro, 1 Unit Dial, (HumaLOG KwikPen) 100 UNIT/ML solution pen-injector Inject 8 Units under the skin into the appropriate area as directed 3 (Three) Times a Day. Patient utilizes sliding scale to determine insulin administration 11/6/23  Yes July Johnson APRN   Insulin Pen Needle (Pen Needles) 32G X 6 MM misc Use 1 each 4 (Four) Times a Day. 10/3/24  Yes July Johnson APRN   lisinopril (PRINIVIL,ZESTRIL) 40 MG tablet Take 1 tablet by mouth Daily. 12/10/24  Yes July Johnson APRN   metFORMIN (GLUCOPHAGE) 1000 MG tablet Take 1 tablet by mouth 2 (Two) Times a Day With Meals. 6/13/24  Yes Jefry Blackman MD   Omega-3 Fatty Acids (fish oil) 1000 MG capsule capsule Take 2 capsules by mouth Daily With Breakfast. 6/16/23  Yes July Johnson APRN   omeprazole (priLOSEC) 20 MG capsule Take 1 capsule by mouth Daily. 6/16/23  Yes July Johnson APRN   sildenafil (REVATIO) 20 MG tablet Take 1 tablet by mouth Daily. 10/8/24  Yes July Johnson APRN   SUMAtriptan (Imitrex) 50 MG tablet Take one tablet at onset of headache. May repeat dose one time in 2 hours if headache not relieved. 2/23/24  Yes Devin De La Cruz APRN   Tirzepatide (MOUNJARO) 2.5 MG/0.5ML solution pen-injector pen Inject 0.5 mL under the skin into the appropriate area as directed 1 (One) Time Per Week. 10/3/24  Yes July Johnson APRN   traZODone (DESYREL) 50 MG tablet Take 1 tablet by mouth Every Night. 10/3/24  Yes July Johnson APRN   triamcinolone (KENALOG) 0.1 % cream Apply  topically to the appropriate area as directed 2 (Two) Times a Day. 8/8/24  Yes Jefry Blackman MD   vitamin D (ERGOCALCIFEROL) 1.25 MG (59454 UT) capsule capsule Take 1 capsule by mouth 1 (One) Time Per Week. 1/25/24  Yes Devin De La Cruz APRN   metFORMIN (GLUCOPHAGE) 1000 MG tablet Take 1 tablet by mouth 2 (Two) Times a Day With Meals for 30 days. 6/13/24 7/13/24   "Jefry Blackman MD       EVELYNE:        PHQ-9 Depression Screening  Little interest or pleasure in doing things?     Feeling down, depressed, or hopeless?     PHQ-2 Total Score     Trouble falling or staying asleep, or sleeping too much?     Feeling tired or having little energy?     Poor appetite or overeating?     Feeling bad about yourself - or that you are a failure or have let yourself or your family down?     Trouble concentrating on things, such as reading the newspaper or watching television?     Moving or speaking so slowly that other people could have noticed? Or the opposite - being so fidgety or restless that you have been moving around a lot more than usual?     Thoughts that you would be better off dead, or of hurting yourself in some way?     PHQ-9 Total Score     If you checked off any problems, how difficult have these problems made it for you to do your work, take care of things at home, or get along with other people?         Objective     Vital Signs: /77 (BP Location: Right arm, Patient Position: Sitting, Cuff Size: Large Adult)   Pulse 75   Resp 17   Ht 182.9 cm (72\")   Wt 89.8 kg (198 lb)   SpO2 98%   BMI 26.85 kg/m²   Physical Exam  Vitals and nursing note reviewed.   Constitutional:       General: He is not in acute distress.     Appearance: Normal appearance. He is not ill-appearing.   HENT:      Head: Normocephalic and atraumatic.      Nose: Nose normal.      Mouth/Throat:      Mouth: Mucous membranes are moist.      Pharynx: No posterior oropharyngeal erythema.   Eyes:      General: No scleral icterus.     Extraocular Movements: Extraocular movements intact.      Conjunctiva/sclera: Conjunctivae normal.      Pupils: Pupils are equal, round, and reactive to light.   Cardiovascular:      Rate and Rhythm: Normal rate and regular rhythm.      Pulses: Normal pulses.      Heart sounds: Normal heart sounds.   Pulmonary:      Effort: Pulmonary effort is normal. No respiratory " distress.      Breath sounds: Normal breath sounds. No wheezing.   Abdominal:      General: Abdomen is flat. Bowel sounds are normal.      Palpations: Abdomen is soft.      Tenderness: There is no abdominal tenderness.   Musculoskeletal:         General: Normal range of motion.      Cervical back: Normal range of motion.      Right lower leg: No edema.      Left lower leg: No edema.   Feet:      Right foot:      Protective Sensation: 10 sites tested.  10 sites sensed.      Skin integrity: Skin integrity normal.      Left foot:      Protective Sensation: 10 sites tested.  10 sites sensed.      Skin integrity: Skin integrity normal.   Skin:     General: Skin is warm and dry.      Findings: No erythema or rash.   Neurological:      General: No focal deficit present.      Mental Status: He is alert and oriented to person, place, and time. Mental status is at baseline.      Motor: No weakness.   Psychiatric:         Mood and Affect: Mood normal.         Behavior: Behavior normal.         Thought Content: Thought content normal.         Judgment: Judgment normal.         Advance Care Planning   ACP discussion was held with the patient during this visit.         Results Reviewed:  Glucose   Date Value Ref Range Status   10/04/2024 166 (H) 70 - 99 mg/dL Final     BUN   Date Value Ref Range Status   10/04/2024 19 6 - 24 mg/dL Final     Creatinine   Date Value Ref Range Status   10/04/2024 1.16 0.76 - 1.27 mg/dL Final     Sodium   Date Value Ref Range Status   10/04/2024 141 134 - 144 mmol/L Final     Potassium   Date Value Ref Range Status   10/04/2024 4.9 3.5 - 5.2 mmol/L Final     Chloride   Date Value Ref Range Status   10/04/2024 103 96 - 106 mmol/L Final     Total CO2   Date Value Ref Range Status   10/04/2024 23 20 - 29 mmol/L Final     Calcium   Date Value Ref Range Status   10/04/2024 9.4 8.7 - 10.2 mg/dL Final     ALT (SGPT)   Date Value Ref Range Status   10/04/2024 25 0 - 44 IU/L Final     AST (SGOT)   Date Value  Ref Range Status   10/04/2024 19 0 - 40 IU/L Final     WBC   Date Value Ref Range Status   10/04/2024 7.8 3.4 - 10.8 x10E3/uL Final     Hematocrit   Date Value Ref Range Status   10/04/2024 45.5 37.5 - 51.0 % Final     Platelets   Date Value Ref Range Status   10/04/2024 305 150 - 450 x10E3/uL Final     Triglycerides   Date Value Ref Range Status   10/04/2024 109 0 - 149 mg/dL Final     HDL Cholesterol   Date Value Ref Range Status   10/04/2024 49 >39 mg/dL Final     LDL Chol Calc (NIH)   Date Value Ref Range Status   10/04/2024 108 (H) 0 - 99 mg/dL Final     Hemoglobin A1C   Date Value Ref Range Status   10/04/2024 9.0 (H) 4.8 - 5.6 % Final     Comment:              Prediabetes: 5.7 - 6.4           Diabetes: >6.4           Glycemic control for adults with diabetes: <7.0     03/20/2024 7.7 (A) 4.5 - 5.7 % Final         Assessment / Plan     Assessment/Plan:    1. Hypertension, unspecified type  -increase norvasc from 2.5mg daily to 5mg daily.     2. Type 2 diabetes mellitus with hyperglycemia, with long-term current use of insulin  - POC Glycosylated Hemoglobin (Hb A1C)  - Insulin Pen Needle (Pen Needles) 32G X 6 MM misc; Use 1 each 4 (Four) Times a Day.  Dispense: 300 each; Refill: 12    3. Moderate major depression  - buPROPion XL (Wellbutrin XL) 300 MG 24 hr tablet; Take 1 tablet by mouth Daily.  Dispense: 90 tablet; Refill: 1    4. Anxiety  - busPIRone (BUSPAR) 10 MG tablet; Take 1 tablet by mouth 2 (Two) Times a Day.  Dispense: 60 tablet; Refill: 0    5. Type 2 diabetes mellitus with hypoglycemia without coma, with long-term current use of insulin  - Insulin Lispro (HumaLOG) 100 UNIT/ML injection; Inject 8 Units under the skin into the appropriate area as directed 3 (Three) Times a Day Before Meals.  Dispense: 10 mL; Refill: 12    Diagnoses and all orders for this visit:    1. Hypertension, unspecified type (Primary)    2. Type 2 diabetes mellitus with hyperglycemia, with long-term current use of insulin  -      POC Glycosylated Hemoglobin (Hb A1C)  -     Insulin Pen Needle (Pen Needles) 32G X 6 MM misc; Use 1 each 4 (Four) Times a Day.  Dispense: 300 each; Refill: 12    3. Moderate major depression  -     buPROPion XL (Wellbutrin XL) 300 MG 24 hr tablet; Take 1 tablet by mouth Daily.  Dispense: 90 tablet; Refill: 1    4. Anxiety  -     busPIRone (BUSPAR) 10 MG tablet; Take 1 tablet by mouth 2 (Two) Times a Day.  Dispense: 60 tablet; Refill: 0    5. Type 2 diabetes mellitus with hypoglycemia without coma, with long-term current use of insulin  -     Insulin Lispro (HumaLOG) 100 UNIT/ML injection; Inject 8 Units under the skin into the appropriate area as directed 3 (Three) Times a Day Before Meals.  Dispense: 10 mL; Refill: 12        Assessment & Plan  1. Hypertension.  His blood pressure remains elevated despite the current medication regimen. The dosage of amlodipine will be increased to 5 mg. He is advised to monitor his blood pressure closely and report any significant changes.    2. Diabetes Mellitus.  A point-of-care A1c test will be conducted today. He will continue his current insulin regimen, including Humalog and Lantus. Prescriptions for insulin and needles have been renewed and sent to [facility]. If he decides to discontinue the Dexcom device, a new glucometer will be provided.    3. Anxiety.  The dosage of Wellbutrin will be increased to 300 mg. Additionally, BuSpar will be introduced into his treatment plan, to be taken twice daily for the first week, and then as needed thereafter. Prescriptions for Wellbutrin and BuSpar have been renewed and sent to [facility], respectively. If the current treatment proves insufficient, he is advised to contact the office for further adjustments.    4. Health Maintenance.  He is scheduled to receive his influenza vaccine on Tuesday.    Return in about 3 months (around 4/3/2025) for Annual physical. unless patient needs to be seen sooner or acute issues  arise.      I have discussed the patient results/orders and and plan/recommendation with them at today's visit.    Patient or patient representative verbalized consent for the use of Ambient Listening during the visit with  RADHA Mccann for chart documentation. 1/3/2025  07:55 CST  RADHA Mccann   01/03/2025

## 2025-01-07 ENCOUNTER — CLINICAL SUPPORT (OUTPATIENT)
Dept: INTERNAL MEDICINE | Facility: CLINIC | Age: 55
End: 2025-01-07
Payer: COMMERCIAL

## 2025-01-07 DIAGNOSIS — I10 HYPERTENSION, UNSPECIFIED TYPE: ICD-10-CM

## 2025-01-07 DIAGNOSIS — Z23 FLU VACCINE NEED: Primary | ICD-10-CM

## 2025-01-07 PROCEDURE — 90656 IIV3 VACC NO PRSV 0.5 ML IM: CPT

## 2025-01-07 PROCEDURE — 90471 IMMUNIZATION ADMIN: CPT

## 2025-01-07 RX ORDER — LISINOPRIL 40 MG/1
40 TABLET ORAL DAILY
Qty: 60 TABLET | Refills: 0 | Status: SHIPPED | OUTPATIENT
Start: 2025-01-07

## 2025-01-07 NOTE — TELEPHONE ENCOUNTER
Rx Refill Note  Requested Prescriptions     Pending Prescriptions Disp Refills    lisinopril (PRINIVIL,ZESTRIL) 40 MG tablet 60 tablet 0     Sig: Take 1 tablet by mouth Daily.      Last office visit with prescribing clinician: 1/3/2025   Last telemedicine visit with prescribing clinician: Visit date not found   Next office visit with prescribing clinician: 4/3/2025                         Would you like a call back once the refill request has been completed: [] Yes [] No    If the office needs to give you a call back, can they leave a voicemail: [] Yes [] No    Samia No MA  01/07/25, 13:23 CST

## 2025-01-07 NOTE — PROGRESS NOTES
Vaccine Administration     Aydin Bonilla presented to the office for vaccine administration. Discussed risks/benefits to vaccination, reviewed components of the vaccine, discussed VIS, discussed informed consent, informed consent obtained. Patient/Parent was allowed to accept or refuse vaccine. Questions answered to satisfactory state of patient/parent. We reviewed typical age appropriate and seasonally appropriate vaccinations. Reviewed immunization history and updated state vaccination form as needed. Patient was counseled on Influenza.     Vaccine(s) Administered: Influenza  Vaccine administered by: Samia No CMA.   Injection Site: Intramuscular  Supplied: Clinic Supplied    Vaccine administration was Well tolerated by patient.. Patient was briefly monitored for reaction and was discharged.

## 2025-01-07 NOTE — LETTER
[facility]  Vaccine Consent Form    Patient Name:  Aydin Bonilla  Patient :  1970     Vaccine(s) Ordered    Fluzone >6mos ([id number])        Screening Checklist  The following questions should be completed prior to vaccination. If you answer “yes” to any question, it does not necessarily mean you should not be vaccinated. It just means we may need to clarify or ask more questions. If a question is unclear, please ask your healthcare provider to explain it.    Yes No   Any fever or moderate to severe illness today (mild illness and/or antibiotic treatment are not contraindications)?     Do you have a history of a serious reaction to any previous vaccinations, such as anaphylaxis, encephalopathy within 7 days, Guillain-Palmyra syndrome within 6 weeks, seizure?     Have you received any live vaccine(s) (e.g MMR, UTE) or any other vaccines in the last month (to ensure duplicate doses aren't given)?     Do you have an anaphylactic allergy to latex (DTaP, DTaP-IPV, Hep A, Hep B, MenB, RV, Td, Tdap), baker’s yeast (Hep B, HPV), polysorbates (RSV, nirsevimab, PCV 20, Rotavirrus, Tdap, Shingrix), or gelatin (UTE, MMR)?     Do you have an anaphylactic allergy to neomycin (Rabies, UTE, MMR, IPV, Hep A), polymyxin B (IPV), or streptomycin (IPV)?      Any cancer, leukemia, AIDS, or other immune system disorder? (UTE, MMR, RV)     Do you have a parent, brother, or sister with an immune system problem (if immune competence of vaccine recipient clinically verified, can proceed)? (MMR, UTE)     Any recent steroid treatments for >2 weeks, chemotherapy, or radiation treatment? (UTE, MMR)     Have you received antibody-containing blood transfusions or IVIG in the past 11 months (recommended interval is dependent on product)? (MMR, UTE)     Have you taken antiviral drugs (acyclovir, famciclovir, valacyclovir for UTE) in the last 24 or 48 hours, respectively?      Are you pregnant or planning to become pregnant within 1 month?  "(UTE, MMR, HPV, IPV, MenB, Abrexvy; For Hep B- refer to Engerix-B; For RSV - Abrysvo is indicated for 32-36 weeks of pregnancy from September to January)     For infants, have you ever been told your child has had intussusception or a medical emergency involving obstruction of the intestine (Rotavirus)? If not for an infant, can skip this question.         *Ordering Physicians/APC should be consulted if \"yes\" is checked by the patient or guardian above.  I have received, read, and understand the Vaccine Information Statement (VIS) for each vaccine ordered.  I have considered my or my child's health status as well as the health status of my close contacts.  I have taken the opportunity to discuss my vaccine questions with my or my child's health care provider.   I have requested that the ordered vaccine(s) be given to me or my child.  I understand the benefits and risks of the vaccines.  I understand that I should remain in the clinic for 15 minutes after receiving the vaccine(s).  _________________________________________________________  Signature of Patient or Parent/Legal Guardian ____________________  Date     "

## 2025-01-09 RX ORDER — LANCETS 30 GAUGE
100 EACH MISCELLANEOUS 3 TIMES DAILY
Qty: 100 EACH | Refills: 12 | Status: SHIPPED | OUTPATIENT
Start: 2025-01-09

## 2025-01-09 NOTE — TELEPHONE ENCOUNTER
Rx Refill Note  Requested Prescriptions     Pending Prescriptions Disp Refills    glucose blood test strip 100 each 12     Sig: Use as instructed    OneTouch Delica Lancets 30G misc 100 each      Sig: Use 100 each.      Last office visit with prescribing clinician: 1/3/2025   Last telemedicine visit with prescribing clinician: Visit date not found   Next office visit with prescribing clinician: 4/3/2025                         Would you like a call back once the refill request has been completed: [] Yes [] No    If the office needs to give you a call back, can they leave a voicemail: [] Yes [] No    Samia No MA  01/09/25, 07:27 CST

## 2025-01-13 DIAGNOSIS — E11.649 TYPE 2 DIABETES MELLITUS WITH HYPOGLYCEMIA WITHOUT COMA, WITH LONG-TERM CURRENT USE OF INSULIN: Primary | ICD-10-CM

## 2025-01-13 DIAGNOSIS — Z79.4 TYPE 2 DIABETES MELLITUS WITH HYPOGLYCEMIA WITHOUT COMA, WITH LONG-TERM CURRENT USE OF INSULIN: Primary | ICD-10-CM

## 2025-01-13 RX ORDER — INSULIN GLARGINE-YFGN 100 [IU]/ML
60 INJECTION, SOLUTION SUBCUTANEOUS DAILY
Qty: 20 ML | Refills: 12 | Status: SHIPPED | OUTPATIENT
Start: 2025-01-13 | End: 2025-01-14 | Stop reason: SDUPTHER

## 2025-01-14 DIAGNOSIS — Z79.4 TYPE 2 DIABETES MELLITUS WITH HYPERGLYCEMIA, WITH LONG-TERM CURRENT USE OF INSULIN: ICD-10-CM

## 2025-01-14 DIAGNOSIS — Z79.4 TYPE 2 DIABETES MELLITUS WITH HYPOGLYCEMIA WITHOUT COMA, WITH LONG-TERM CURRENT USE OF INSULIN: ICD-10-CM

## 2025-01-14 DIAGNOSIS — E11.65 TYPE 2 DIABETES MELLITUS WITH HYPERGLYCEMIA, WITH LONG-TERM CURRENT USE OF INSULIN: ICD-10-CM

## 2025-01-14 DIAGNOSIS — E11.649 TYPE 2 DIABETES MELLITUS WITH HYPOGLYCEMIA WITHOUT COMA, WITH LONG-TERM CURRENT USE OF INSULIN: ICD-10-CM

## 2025-01-14 RX ORDER — INSULIN LISPRO 100 [IU]/ML
8 INJECTION, SOLUTION INTRAVENOUS; SUBCUTANEOUS
Qty: 30 ML | Refills: 1 | Status: SHIPPED | OUTPATIENT
Start: 2025-01-14

## 2025-01-14 RX ORDER — INSULIN GLARGINE-YFGN 100 [IU]/ML
60 INJECTION, SOLUTION SUBCUTANEOUS DAILY
Qty: 20 ML | Refills: 12 | Status: SHIPPED | OUTPATIENT
Start: 2025-01-14

## 2025-01-14 RX ORDER — INSULIN GLARGINE-YFGN 100 [IU]/ML
60 INJECTION, SOLUTION SUBCUTANEOUS DAILY
Qty: 20 ML | Refills: 12 | Status: SHIPPED | OUTPATIENT
Start: 2025-01-14 | End: 2025-01-14 | Stop reason: SDUPTHER

## 2025-01-14 RX ORDER — BLOOD SUGAR DIAGNOSTIC
1 STRIP MISCELLANEOUS 4 TIMES DAILY
Qty: 300 EACH | Refills: 12 | Status: SHIPPED | OUTPATIENT
Start: 2025-01-14 | End: 2025-01-14 | Stop reason: SDUPTHER

## 2025-01-14 RX ORDER — IBUPROFEN 200 MG
1 TABLET ORAL 4 TIMES DAILY
Qty: 100 EACH | Refills: 0 | Status: SHIPPED | OUTPATIENT
Start: 2025-01-14 | End: 2025-01-14 | Stop reason: SDUPTHER

## 2025-01-14 RX ORDER — IBUPROFEN 200 MG
1 TABLET ORAL 4 TIMES DAILY
Qty: 100 EACH | Refills: 0 | Status: SHIPPED | OUTPATIENT
Start: 2025-01-14

## 2025-01-14 RX ORDER — BLOOD SUGAR DIAGNOSTIC
1 STRIP MISCELLANEOUS 4 TIMES DAILY
Qty: 300 EACH | Refills: 12 | Status: SHIPPED | OUTPATIENT
Start: 2025-01-14

## 2025-01-22 ENCOUNTER — TELEPHONE (OUTPATIENT)
Dept: INTERNAL MEDICINE | Facility: CLINIC | Age: 55
End: 2025-01-22
Payer: COMMERCIAL

## 2025-01-22 NOTE — TELEPHONE ENCOUNTER
[facility] - [phone]    REF [id number]    NEED A CALL BACK TO CLARIFY WHICH INSULIN PLEASE AND THE PHARMACY NOTES YOUR WROTE ON THE ORDER.

## 2025-01-28 DIAGNOSIS — G47.9 SLEEP DISTURBANCES: ICD-10-CM

## 2025-01-28 RX ORDER — TRAZODONE HYDROCHLORIDE 50 MG/1
50 TABLET, FILM COATED ORAL NIGHTLY
Qty: 30 TABLET | Refills: 0 | Status: SHIPPED | OUTPATIENT
Start: 2025-01-28

## 2025-03-18 DIAGNOSIS — I10 HYPERTENSION, UNSPECIFIED TYPE: ICD-10-CM

## 2025-03-18 DIAGNOSIS — E11.649 TYPE 2 DIABETES MELLITUS WITH HYPOGLYCEMIA WITHOUT COMA, WITH LONG-TERM CURRENT USE OF INSULIN: ICD-10-CM

## 2025-03-18 DIAGNOSIS — G47.9 SLEEP DISTURBANCES: ICD-10-CM

## 2025-03-18 DIAGNOSIS — Z79.4 TYPE 2 DIABETES MELLITUS WITH HYPOGLYCEMIA WITHOUT COMA, WITH LONG-TERM CURRENT USE OF INSULIN: ICD-10-CM

## 2025-03-18 RX ORDER — TRAZODONE HYDROCHLORIDE 50 MG/1
50 TABLET ORAL NIGHTLY
Qty: 30 TABLET | Refills: 0 | Status: SHIPPED | OUTPATIENT
Start: 2025-03-18

## 2025-03-18 RX ORDER — LISINOPRIL 40 MG/1
40 TABLET ORAL DAILY
Qty: 60 TABLET | Refills: 0 | Status: SHIPPED | OUTPATIENT
Start: 2025-03-18

## 2025-03-18 NOTE — TELEPHONE ENCOUNTER
Rx Refill Note  Requested Prescriptions     Pending Prescriptions Disp Refills    lisinopril (PRINIVIL,ZESTRIL) 40 MG tablet 60 tablet 0     Sig: Take 1 tablet by mouth Daily.    metFORMIN (GLUCOPHAGE) 1000 MG tablet 120 tablet 3     Sig: Take 1 tablet by mouth 2 (Two) Times a Day With Meals.    traZODone (DESYREL) 50 MG tablet 30 tablet 0     Sig: Take 1 tablet by mouth Every Night.      Last office visit with prescribing clinician: 1/3/2025   Last telemedicine visit with prescribing clinician: Visit date not found   Next office visit with prescribing clinician: 4/3/2025                         Would you like a call back once the refill request has been completed: [] Yes [] No    If the office needs to give you a call back, can they leave a voicemail: [] Yes [] No    Samia No MA  03/18/25, 07:45 CDT

## 2025-04-08 ENCOUNTER — OFFICE VISIT (OUTPATIENT)
Dept: INTERNAL MEDICINE | Facility: CLINIC | Age: 55
End: 2025-04-08
Payer: COMMERCIAL

## 2025-04-08 VITALS
DIASTOLIC BLOOD PRESSURE: 74 MMHG | SYSTOLIC BLOOD PRESSURE: 140 MMHG | HEIGHT: 72 IN | OXYGEN SATURATION: 98 % | BODY MASS INDEX: 25.79 KG/M2 | RESPIRATION RATE: 17 BRPM | WEIGHT: 190.4 LBS | HEART RATE: 78 BPM

## 2025-04-08 DIAGNOSIS — Z79.4 TYPE 2 DIABETES MELLITUS WITH HYPOGLYCEMIA WITHOUT COMA, WITH LONG-TERM CURRENT USE OF INSULIN: ICD-10-CM

## 2025-04-08 DIAGNOSIS — Z12.5 SCREENING FOR MALIGNANT NEOPLASM OF PROSTATE: ICD-10-CM

## 2025-04-08 DIAGNOSIS — F32.1 MODERATE MAJOR DEPRESSION: ICD-10-CM

## 2025-04-08 DIAGNOSIS — Z00.00 ROUTINE GENERAL MEDICAL EXAMINATION AT A HEALTH CARE FACILITY: ICD-10-CM

## 2025-04-08 DIAGNOSIS — E53.8 VITAMIN B12 DEFICIENCY: ICD-10-CM

## 2025-04-08 DIAGNOSIS — F41.9 ANXIETY: ICD-10-CM

## 2025-04-08 DIAGNOSIS — Z12.11 COLON CANCER SCREENING: Primary | ICD-10-CM

## 2025-04-08 DIAGNOSIS — I10 PRIMARY HYPERTENSION: ICD-10-CM

## 2025-04-08 DIAGNOSIS — R53.82 CHRONIC FATIGUE: ICD-10-CM

## 2025-04-08 DIAGNOSIS — G47.9 SLEEP DISTURBANCES: ICD-10-CM

## 2025-04-08 DIAGNOSIS — E11.649 TYPE 2 DIABETES MELLITUS WITH HYPOGLYCEMIA WITHOUT COMA, WITH LONG-TERM CURRENT USE OF INSULIN: ICD-10-CM

## 2025-04-08 DIAGNOSIS — Z23 NEED FOR VACCINATION: ICD-10-CM

## 2025-04-08 DIAGNOSIS — E78.00 ELEVATED LDL CHOLESTEROL LEVEL: ICD-10-CM

## 2025-04-08 DIAGNOSIS — E55.9 VITAMIN D DEFICIENCY: ICD-10-CM

## 2025-04-08 RX ORDER — TRIAMCINOLONE ACETONIDE 1 MG/G
CREAM TOPICAL 2 TIMES DAILY
Qty: 28.4 G | Refills: 2 | Status: SHIPPED | OUTPATIENT
Start: 2025-04-08

## 2025-04-08 RX ORDER — TRAZODONE HYDROCHLORIDE 50 MG/1
50 TABLET ORAL NIGHTLY
Qty: 90 TABLET | Refills: 3 | Status: SHIPPED | OUTPATIENT
Start: 2025-04-08

## 2025-04-08 RX ORDER — BUPROPION HYDROCHLORIDE 300 MG/1
300 TABLET ORAL DAILY
Qty: 90 TABLET | Refills: 3 | Status: SHIPPED | OUTPATIENT
Start: 2025-04-08

## 2025-04-08 RX ORDER — INSULIN LISPRO 100 [IU]/ML
8 INJECTION, SOLUTION INTRAVENOUS; SUBCUTANEOUS
Qty: 30 ML | Refills: 3 | Status: SHIPPED | OUTPATIENT
Start: 2025-04-08

## 2025-04-08 RX ORDER — BUSPIRONE HYDROCHLORIDE 10 MG/1
10 TABLET ORAL 2 TIMES DAILY
Qty: 180 TABLET | Refills: 1 | Status: SHIPPED | OUTPATIENT
Start: 2025-04-08

## 2025-04-08 NOTE — PROGRESS NOTES
Subjective     Chief Complaint   Patient presents with    Annual Exam       History of Present Illness  History of Present Illness  The patient presents for evaluation of diabetes, hypertension, anxiety, sleep disturbance, eye pain, and foot pain.    He reports suboptimal glycemic control with blood glucose levels ranging from 180 to 210. He has experienced weight loss, which he attributes to stress. He is currently on a regimen of long-acting insulin at 40 units, which he reduced due to hypoglycemic episodes. He also administers short-acting insulin between 7 and 8 units in the morning, which he believes may be contributing to rapid drops in blood glucose levels. His diet includes cereal, evangelista, and toast for breakfast. He is requesting a refill of his insulin prescription. He is not taking Mounjaro.    He reports that his blood pressure readings at home have been consistently around 140 to145. He has experienced episodes of shakiness, which he does not believe are related to hypoglycemia or anxiety.    He reports that his current medications, Wellbutrin and BuSpar, are effective in managing his symptoms. He has reduced the frequency of his fast-acting medication, but is uncertain about its continued efficacy. He has experienced episodes of shakiness, which he does not believe are related to hypoglycemia or anxiety. He also reports an incident where he experienced difficulty understanding a report from a previous manager, followed by pain radiating from his elbow to his stomach and down to his feet. He is requesting refills of his BuSpar and Wellbutrin prescriptions.    He reports good sleep quality, except when working night shifts. He finds trazodone beneficial and reports that it prevents him from waking up between 3 and 4 AM. He is requesting a refill of his trazodone prescription.    He reports intermittent sharp pain in his eye, occurring once every 2 to 3 weeks. The pain is localized to the side of  the eye and lasts for approximately 15 to 20 seconds. He does not experience any associated vision loss or blurred vision. He had an eye examination last year and is due for another one. He reports that his vision is not very strong and believes it is deteriorating. He is considering changing his ophthalmologist.    He reports a sensation of a foreign body in the ball of his foot, accompanied by mild pain. He does not recall stepping on glass.    He is due for a tetanus vaccine today. He is due for a colonoscopy. He is interested in getting shingles vaccine but wants to get it at a later date as he will be watching his grandkids tomorrow and Thursday.    MEDICATIONS  Current: Wellbutrin, BuSpar, trazodone, triamcinolone cream, Semglee, lispro    IMMUNIZATIONS  He is due for a tetanus vaccine today.    Patient's PMR from outside medical facility reviewed and noted.    Review of Systems     Otherwise complete ROS reviewed and negative except as mentioned in the HPI.    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 He has never been exposed to tobacco smoke. He has never used smokeless tobacco. He reports current alcohol use. He reports that he does not use drugs.    Family History: family history includes Alzheimer's disease in his father; Anxiety disorder in his mother; Depression in his mother; Diabetes in his mother, sister, and sister; Hyperlipidemia in his mother; Hypertension in his mother.      Allergies:  Allergies   Allergen Reactions    Penicillins GI Intolerance     Medications:  Prior to Admission medications    Medication Sig Start Date End Date Taking? Authorizing Provider   buPROPion XL (Wellbutrin XL)  "300 MG 24 hr tablet Take 1 tablet by mouth Daily. 1/3/25  Yes July Johnson APRN   busPIRone (BUSPAR) 10 MG tablet Take 1 tablet by mouth 2 (Two) Times a Day. 1/3/25  Yes July Johnson APRN   Continuous Glucose Sensor (Dexcom G6 Sensor) Use Every 10 (Ten) Days. 7/9/24  Yes Jefry Blackman MD   Continuous Glucose Transmitter (Dexcom G6 Transmitter) misc Use 1 each Daily. 7/9/24  Yes Jefry Blackman MD   glucose blood test strip Use as instructed 1/9/25  Yes July Johnson APRN   insulin glargine (LANTUS, SEMGLEE) 100 UNIT/ML injection Inject 60 Units under the skin into the appropriate area as directed Daily. 10/3/24  Yes July Johnson APRN   Insulin Glargine-yfgn (SEMGLEE-YFGN) 100 UNIT/ML Inject 60 Units under the skin into the appropriate area as directed Daily. 1/14/25  Yes July Johnson APRN   Insulin Lispro (HumaLOG) 100 UNIT/ML injection Inject 8 Units under the skin into the appropriate area as directed 3 (Three) Times a Day Before Meals. 1/14/25  Yes July Johnson APRN   Insulin Pen Needle (Pen Needles) 32G X 6 MM misc Use 1 each 4 (Four) Times a Day. 1/14/25  Yes July Johnson APRN   Insulin Syringe (B-D INSULIN SYRINGE) 29G X 1/2\" 1 ML misc Use 1 each 4 (Four) Times a Day. 1/14/25  Yes July Johnson APRN   lisinopril (PRINIVIL,ZESTRIL) 40 MG tablet Take 1 tablet by mouth Daily. 3/18/25  Yes July Johnson APRN   metFORMIN (GLUCOPHAGE) 1000 MG tablet Take 1 tablet by mouth 2 (Two) Times a Day With Meals. 3/18/25  Yes July Johnson APRN   Omega-3 Fatty Acids (fish oil) 1000 MG capsule capsule Take 2 capsules by mouth Daily With Breakfast. 6/16/23  Yes July Johnson APRN   omeprazole (priLOSEC) 20 MG capsule Take 1 capsule by mouth Daily. 6/16/23  Yes July Johnson APRN   OneTouch Delica Lancets 30G misc Use 100 each 3 times a day. 1/9/25  Yes July Johnson APRN   sildenafil (REVATIO) 20 MG tablet Take 1 tablet by mouth Daily. 10/8/24  Yes " "July Johnson APRN   SUMAtriptan (Imitrex) 50 MG tablet Take one tablet at onset of headache. May repeat dose one time in 2 hours if headache not relieved. 2/23/24  Yes Devin De La Cruz APRN   Tirzepatide (MOUNJARO) 2.5 MG/0.5ML solution pen-injector pen Inject 0.5 mL under the skin into the appropriate area as directed 1 (One) Time Per Week. 10/3/24  Yes July Johnson APRN   traZODone (DESYREL) 50 MG tablet Take 1 tablet by mouth Every Night. 3/18/25  Yes July Johnson APRN   triamcinolone (KENALOG) 0.1 % cream Apply  topically to the appropriate area as directed 2 (Two) Times a Day. 8/8/24  Yes Jefry Blackman MD   vitamin D (ERGOCALCIFEROL) 1.25 MG (47193 UT) capsule capsule Take 1 capsule by mouth 1 (One) Time Per Week. 1/25/24  Yes Devin De La Cruz APRN       EVELYNE:        PHQ-9 Depression Screening  Little interest or pleasure in doing things?     Feeling down, depressed, or hopeless?     PHQ-2 Total Score     Trouble falling or staying asleep, or sleeping too much?     Feeling tired or having little energy?     Poor appetite or overeating?     Feeling bad about yourself - or that you are a failure or have let yourself or your family down?     Trouble concentrating on things, such as reading the newspaper or watching television?     Moving or speaking so slowly that other people could have noticed? Or the opposite - being so fidgety or restless that you have been moving around a lot more than usual?     Thoughts that you would be better off dead, or of hurting yourself in some way?     PHQ-9 Total Score     If you checked off any problems, how difficult have these problems made it for you to do your work, take care of things at home, or get along with other people?             Objective     Vital Signs: /74 (BP Location: Right arm, Patient Position: Sitting, Cuff Size: Adult)   Pulse 78   Resp 17   Ht 182.9 cm (72\")   Wt 86.4 kg (190 lb 6.4 oz)   SpO2 98%   BMI 25.82 kg/m² "   Physical Exam  Vitals and nursing note reviewed.   Constitutional:       General: He is not in acute distress.     Appearance: Normal appearance. He is not ill-appearing.   HENT:      Head: Normocephalic and atraumatic.      Right Ear: External ear normal.      Left Ear: External ear normal.      Nose: Nose normal.      Mouth/Throat:      Mouth: Mucous membranes are moist.      Pharynx: No posterior oropharyngeal erythema.   Eyes:      General: No scleral icterus.     Extraocular Movements: Extraocular movements intact.      Conjunctiva/sclera: Conjunctivae normal.      Pupils: Pupils are equal, round, and reactive to light.   Cardiovascular:      Rate and Rhythm: Normal rate and regular rhythm.      Pulses: Normal pulses.      Heart sounds: Normal heart sounds.   Pulmonary:      Effort: Pulmonary effort is normal. No respiratory distress.      Breath sounds: Normal breath sounds. No wheezing.   Abdominal:      General: Abdomen is flat. Bowel sounds are normal.      Palpations: Abdomen is soft.      Tenderness: There is no abdominal tenderness.   Musculoskeletal:         General: Normal range of motion.      Cervical back: Normal range of motion.      Right lower leg: No edema.      Left lower leg: No edema.   Skin:     General: Skin is warm and dry.      Findings: No erythema or rash.   Neurological:      General: No focal deficit present.      Mental Status: He is alert and oriented to person, place, and time. Mental status is at baseline.      Motor: No weakness.   Psychiatric:         Mood and Affect: Mood normal.         Behavior: Behavior normal.         Thought Content: Thought content normal.         Judgment: Judgment normal.              Advance Care Planning   ACP discussion was held with the patient during this visit.         Results Reviewed:  Glucose   Date Value Ref Range Status   10/04/2024 166 (H) 70 - 99 mg/dL Final     BUN   Date Value Ref Range Status   10/04/2024 19 6 - 24 mg/dL Final      Creatinine   Date Value Ref Range Status   10/04/2024 1.16 0.76 - 1.27 mg/dL Final     Sodium   Date Value Ref Range Status   10/04/2024 141 134 - 144 mmol/L Final     Potassium   Date Value Ref Range Status   10/04/2024 4.9 3.5 - 5.2 mmol/L Final     Chloride   Date Value Ref Range Status   10/04/2024 103 96 - 106 mmol/L Final     Total CO2   Date Value Ref Range Status   10/04/2024 23 20 - 29 mmol/L Final     Calcium   Date Value Ref Range Status   10/04/2024 9.4 8.7 - 10.2 mg/dL Final     ALT (SGPT)   Date Value Ref Range Status   10/04/2024 25 0 - 44 IU/L Final     AST (SGOT)   Date Value Ref Range Status   10/04/2024 19 0 - 40 IU/L Final     WBC   Date Value Ref Range Status   10/04/2024 7.8 3.4 - 10.8 x10E3/uL Final     Hematocrit   Date Value Ref Range Status   10/04/2024 45.5 37.5 - 51.0 % Final     Platelets   Date Value Ref Range Status   10/04/2024 305 150 - 450 x10E3/uL Final     Triglycerides   Date Value Ref Range Status   10/04/2024 109 0 - 149 mg/dL Final     HDL Cholesterol   Date Value Ref Range Status   10/04/2024 49 >39 mg/dL Final     LDL Chol Calc (NIH)   Date Value Ref Range Status   10/04/2024 108 (H) 0 - 99 mg/dL Final     Hemoglobin A1C   Date Value Ref Range Status   01/03/2025 7.5 (A) 4.5 - 5.7 % Final         Assessment / Plan     Assessment/Plan:    1. Colon cancer screening  - Ambulatory Referral For Screening Colonoscopy    2. Routine general medical examination at a health care facility    3. Type 2 diabetes mellitus with hypoglycemia without coma, with long-term current use of insulin  - Microalbumin / Creatinine Urine Ratio - Urine, Clean Catch  - Hemoglobin A1c  - Insulin Pen Needle 32G X 4 MM misc; Use 1 each 4 (Four) Times a Day.  Dispense: 1000 each; Refill: 1  - Insulin Lispro (HumaLOG) 100 UNIT/ML injection; Inject 8 Units under the skin into the appropriate area as directed 3 (Three) Times a Day Before Meals.  Dispense: 30 mL; Refill: 3    4. Primary hypertension    -  CBC & Differential  - Comprehensive Metabolic Panel    5. Need for vaccination  - Tdap Vaccine => 8yo IM (BOOSTRIX/ADACEL)    6. Sleep disturbances    - traZODone (DESYREL) 50 MG tablet; Take 1 tablet by mouth Every Night.  Dispense: 90 tablet; Refill: 3    7. Anxiety    - busPIRone (BUSPAR) 10 MG tablet; Take 1 tablet by mouth 2 (Two) Times a Day.  Dispense: 180 tablet; Refill: 1    8. Moderate major depression    - buPROPion XL (Wellbutrin XL) 300 MG 24 hr tablet; Take 1 tablet by mouth Daily.  Dispense: 90 tablet; Refill: 3    9. Screening for malignant neoplasm of prostate    - PSA SCREENING    10. Elevated LDL cholesterol level    - Lipid Panel    11. Chronic fatigue    - TSH  - T4, Free    12. Vitamin B12 deficiency    - Vitamin B12    13. Vitamin D deficiency    - Vitamin D,25-Hydroxy    Diagnoses and all orders for this visit:    1. Colon cancer screening (Primary)  -     Ambulatory Referral For Screening Colonoscopy    2. Routine general medical examination at a health care facility    3. Type 2 diabetes mellitus with hypoglycemia without coma, with long-term current use of insulin  -     Microalbumin / Creatinine Urine Ratio - Urine, Clean Catch  -     Hemoglobin A1c  -     Insulin Pen Needle 32G X 4 MM misc; Use 1 each 4 (Four) Times a Day.  Dispense: 1000 each; Refill: 1  -     Insulin Lispro (HumaLOG) 100 UNIT/ML injection; Inject 8 Units under the skin into the appropriate area as directed 3 (Three) Times a Day Before Meals.  Dispense: 30 mL; Refill: 3    4. Primary hypertension  -     CBC & Differential  -     Comprehensive Metabolic Panel    5. Need for vaccination  -     Tdap Vaccine => 8yo IM (BOOSTRIX/ADACEL)    6. Sleep disturbances  -     traZODone (DESYREL) 50 MG tablet; Take 1 tablet by mouth Every Night.  Dispense: 90 tablet; Refill: 3    7. Anxiety  -     busPIRone (BUSPAR) 10 MG tablet; Take 1 tablet by mouth 2 (Two) Times a Day.  Dispense: 180 tablet; Refill: 1    8. Moderate major  depression  -     buPROPion XL (Wellbutrin XL) 300 MG 24 hr tablet; Take 1 tablet by mouth Daily.  Dispense: 90 tablet; Refill: 3    9. Screening for malignant neoplasm of prostate  -     PSA SCREENING    10. Elevated LDL cholesterol level  -     Lipid Panel    11. Chronic fatigue  -     TSH  -     T4, Free    12. Vitamin B12 deficiency  -     Vitamin B12    13. Vitamin D deficiency  -     Vitamin D,25-Hydroxy    Other orders  -     triamcinolone (KENALOG) 0.1 % cream; Apply  topically to the appropriate area as directed 2 (Two) Times a Day.  Dispense: 28.4 g; Refill: 2        Assessment & Plan  1. Diabetes Mellitus.  His blood glucose levels have been suboptimal, ranging from 180 to 210. He has experienced weight loss, likely due to stress. He is currently on long-acting insulin at 40 units and short-acting insulin between 7 and 8 units in the morning. He was advised to reduce the dosage of his short-acting insulin to prevent rapid drops in blood glucose levels. A prescription for 4 mm needles was provided. Laboratory tests, including urine microalbumin, PSA, kidney function, liver function, electrolytes, and A1c, will be conducted today.    2. Hypertension.  His blood pressure remains slightly elevated, with home readings around 140/145. It was discussed that his anxiety could be contributing to this elevation. He was advised to increase the frequency of BuSpar administration to once daily to help manage his anxiety and potentially lower his blood pressure.    3. Anxiety.  He reports that his current medications, Wellbutrin and BuSpar, are effective in managing his symptoms. He was advised to increase the frequency of BuSpar administration to once daily during periods of increased stress. A 90-day supply of BuSpar was prescribed.    4. Sleep Disturbance.  He reports that trazodone helps with his sleep, especially when working at night. A 90-day supply of trazodone 50 mg was prescribed.    5. Eye Pain.  He  experiences sharp eye pain once every two to three weeks, lasting for 15-20 seconds. He was advised to consult with an ophthalmologist for further evaluation. If the ophthalmologist's examination is normal and the pain persists, a scan may be necessary to investigate potential issues behind the eye.    6. Foot Pain.  He reports feeling something in the ball of his foot, possibly a splinter. An attempt was made to remove it during the visit, but nothing was visible due to callused skin. If the pain persists, further examination will be conducted.    7. Health Maintenance.  He is due for a tetanus vaccine today but declined it. He is also due for a colonoscopy and plans to schedule it soon. The shingles vaccine was discussed, and he expressed interest but decided to postpone it due to upcoming responsibilities with his grandchildren. .Will have lab work here today. Discussed weight management and importance of maintaining a healthy weight. Discussed Vitamin D intake and the importance of adequate vitamin D for both Bone Health and a healthy immune system.  Discussed daily exercise and the importance of same, in regards to a healthy heart as well as helping to maintain his weight and improving his mental health.  BMI  PSA obtained today..       No follow-ups on file. unless patient needs to be seen sooner or acute issues arise.      I have discussed the patient results/orders and and plan/recommendation with them at today's visit.    Patient or patient representative verbalized consent for the use of Ambient Listening during the visit with  RADHA Mccann for chart documentation. 4/8/2025  12:40 CDT  RADHA Mccann   04/08/2025

## 2025-04-08 NOTE — LETTER
[facility]  Vaccine Consent Form    Patient Name:  Aydin Bonilla  Patient :  1970     Vaccine(s) Ordered    Tdap Vaccine => 8yo IM (BOOSTRIX/ADACEL)        Screening Checklist  The following questions should be completed prior to vaccination. If you answer “yes” to any question, it does not necessarily mean you should not be vaccinated. It just means we may need to clarify or ask more questions. If a question is unclear, please ask your healthcare provider to explain it.    Yes No   Any fever or moderate to severe illness today (mild illness and/or antibiotic treatment are not contraindications)?     Do you have a history of a serious reaction to any previous vaccinations, such as anaphylaxis, encephalopathy within 7 days, Guillain-Rosewood syndrome within 6 weeks, seizure?     Have you received any live vaccine(s) (e.g MMR, UTE) or any other vaccines in the last month (to ensure duplicate doses aren't given)?     Do you have an anaphylactic allergy to latex (DTaP, DTaP-IPV, Hep A, Hep B, MenB, RV, Td, Tdap), baker’s yeast (Hep B, HPV), polysorbates (RSV, nirsevimab, PCV 20, Rotavirrus, Tdap, Shingrix), or gelatin (UTE, MMR)?     Do you have an anaphylactic allergy to neomycin (Rabies, UTE, MMR, IPV, Hep A), polymyxin B (IPV), or streptomycin (IPV)?      Any cancer, leukemia, AIDS, or other immune system disorder? (UTE, MMR, RV)     Do you have a parent, brother, or sister with an immune system problem (if immune competence of vaccine recipient clinically verified, can proceed)? (MMR, UTE)     Any recent steroid treatments for >2 weeks, chemotherapy, or radiation treatment? (UTE, MMR)     Have you received antibody-containing blood transfusions or IVIG in the past 11 months (recommended interval is dependent on product)? (MMR, UTE)     Have you taken antiviral drugs (acyclovir, famciclovir, valacyclovir for UTE) in the last 24 or 48 hours, respectively?      Are you pregnant or planning to become pregnant  "within 1 month? (UTE, MMR, HPV, IPV, MenB, Abrexvy; For Hep B- refer to Engerix-B; For RSV - Abrysvo is indicated for 32-36 weeks of pregnancy from September to January)     For infants, have you ever been told your child has had intussusception or a medical emergency involving obstruction of the intestine (Rotavirus)? If not for an infant, can skip this question.         *Ordering Physicians/APC should be consulted if \"yes\" is checked by the patient or guardian above.  I have received, read, and understand the Vaccine Information Statement (VIS) for each vaccine ordered.  I have considered my or my child's health status as well as the health status of my close contacts.  I have taken the opportunity to discuss my vaccine questions with my or my child's health care provider.   I have requested that the ordered vaccine(s) be given to me or my child.  I understand the benefits and risks of the vaccines.  I understand that I should remain in the clinic for 15 minutes after receiving the vaccine(s).  _________________________________________________________  Signature of Patient or Parent/Legal Guardian ____________________  Date   "

## 2025-04-09 LAB
25(OH)D3+25(OH)D2 SERPL-MCNC: 30.1 NG/ML (ref 30–100)
ALBUMIN SERPL-MCNC: 3.9 G/DL (ref 3.8–4.9)
ALBUMIN/CREAT UR: <4 MG/G CREAT (ref 0–29)
ALP SERPL-CCNC: 85 IU/L (ref 44–121)
ALT SERPL-CCNC: 16 IU/L (ref 0–44)
AST SERPL-CCNC: 14 IU/L (ref 0–40)
BASOPHILS # BLD AUTO: 0 X10E3/UL (ref 0–0.2)
BASOPHILS NFR BLD AUTO: 1 %
BILIRUB SERPL-MCNC: 0.3 MG/DL (ref 0–1.2)
BUN SERPL-MCNC: 16 MG/DL (ref 6–24)
BUN/CREAT SERPL: 15 (ref 9–20)
CALCIUM SERPL-MCNC: 9 MG/DL (ref 8.7–10.2)
CHLORIDE SERPL-SCNC: 102 MMOL/L (ref 96–106)
CHOLEST SERPL-MCNC: 150 MG/DL (ref 100–199)
CO2 SERPL-SCNC: 24 MMOL/L (ref 20–29)
CREAT SERPL-MCNC: 1.04 MG/DL (ref 0.76–1.27)
CREAT UR-MCNC: 81.4 MG/DL
EGFRCR SERPLBLD CKD-EPI 2021: 85 ML/MIN/1.73
EOSINOPHIL # BLD AUTO: 0.4 X10E3/UL (ref 0–0.4)
EOSINOPHIL NFR BLD AUTO: 7 %
ERYTHROCYTE [DISTWIDTH] IN BLOOD BY AUTOMATED COUNT: 12.1 % (ref 11.6–15.4)
GLOBULIN SER CALC-MCNC: 2.2 G/DL (ref 1.5–4.5)
GLUCOSE SERPL-MCNC: 222 MG/DL (ref 70–99)
HBA1C MFR BLD: 8.6 % (ref 4.8–5.6)
HCT VFR BLD AUTO: 42.8 % (ref 37.5–51)
HDLC SERPL-MCNC: 43 MG/DL
HGB BLD-MCNC: 14 G/DL (ref 13–17.7)
IMM GRANULOCYTES # BLD AUTO: 0 X10E3/UL (ref 0–0.1)
IMM GRANULOCYTES NFR BLD AUTO: 0 %
LDLC SERPL CALC-MCNC: 92 MG/DL (ref 0–99)
LYMPHOCYTES # BLD AUTO: 2.1 X10E3/UL (ref 0.7–3.1)
LYMPHOCYTES NFR BLD AUTO: 36 %
MCH RBC QN AUTO: 30.9 PG (ref 26.6–33)
MCHC RBC AUTO-ENTMCNC: 32.7 G/DL (ref 31.5–35.7)
MCV RBC AUTO: 95 FL (ref 79–97)
MICROALBUMIN UR-MCNC: <3 UG/ML
MONOCYTES # BLD AUTO: 0.5 X10E3/UL (ref 0.1–0.9)
MONOCYTES NFR BLD AUTO: 8 %
NEUTROPHILS # BLD AUTO: 2.9 X10E3/UL (ref 1.4–7)
NEUTROPHILS NFR BLD AUTO: 48 %
PLATELET # BLD AUTO: 281 X10E3/UL (ref 150–450)
POTASSIUM SERPL-SCNC: 4.7 MMOL/L (ref 3.5–5.2)
PROT SERPL-MCNC: 6.1 G/DL (ref 6–8.5)
PSA SERPL-MCNC: 0.5 NG/ML (ref 0–4)
RBC # BLD AUTO: 4.53 X10E6/UL (ref 4.14–5.8)
SODIUM SERPL-SCNC: 138 MMOL/L (ref 134–144)
T4 FREE SERPL-MCNC: 1.16 NG/DL (ref 0.82–1.77)
TRIGL SERPL-MCNC: 76 MG/DL (ref 0–149)
TSH SERPL DL<=0.005 MIU/L-ACNC: 5.12 UIU/ML (ref 0.45–4.5)
VIT B12 SERPL-MCNC: 498 PG/ML (ref 232–1245)
VLDLC SERPL CALC-MCNC: 15 MG/DL (ref 5–40)
WBC # BLD AUTO: 6 X10E3/UL (ref 3.4–10.8)

## 2025-04-11 ENCOUNTER — RESULTS FOLLOW-UP (OUTPATIENT)
Dept: INTERNAL MEDICINE | Facility: CLINIC | Age: 55
End: 2025-04-11
Payer: COMMERCIAL

## 2025-04-11 DIAGNOSIS — E03.8 OTHER SPECIFIED HYPOTHYROIDISM: Primary | ICD-10-CM

## 2025-04-11 NOTE — TELEPHONE ENCOUNTER
Talked with patient regarding labs. Make insulin adjustments. Discussed diet and glucose monitoring. Discussed TSH will recheck in 1 month.

## 2025-04-30 RX ORDER — TRIAMCINOLONE ACETONIDE 1 MG/G
CREAM TOPICAL 2 TIMES DAILY
Qty: 80 G | Refills: 2 | Status: SHIPPED | OUTPATIENT
Start: 2025-04-30

## 2025-06-06 DIAGNOSIS — I10 HYPERTENSION, UNSPECIFIED TYPE: ICD-10-CM

## 2025-06-06 RX ORDER — LISINOPRIL 40 MG/1
40 TABLET ORAL DAILY
Qty: 60 TABLET | Refills: 0 | Status: SHIPPED | OUTPATIENT
Start: 2025-06-06

## 2025-06-06 NOTE — TELEPHONE ENCOUNTER
Rx Refill Note  Requested Prescriptions     Pending Prescriptions Disp Refills    lisinopril (PRINIVIL,ZESTRIL) 40 MG tablet 60 tablet 0     Sig: Take 1 tablet by mouth Daily.      Last office visit with prescribing clinician: 4/8/2025   Last telemedicine visit with prescribing clinician: Visit date not found   Next office visit with prescribing clinician: 7/8/2025                         Would you like a call back once the refill request has been completed: [] Yes [] No    If the office needs to give you a call back, can they leave a voicemail: [] Yes [] No    Samia No MA  06/06/25, 07:18 CDT

## 2025-07-08 ENCOUNTER — OFFICE VISIT (OUTPATIENT)
Dept: INTERNAL MEDICINE | Facility: CLINIC | Age: 55
End: 2025-07-08
Payer: COMMERCIAL

## 2025-07-08 ENCOUNTER — PRIOR AUTHORIZATION (OUTPATIENT)
Dept: INTERNAL MEDICINE | Facility: CLINIC | Age: 55
End: 2025-07-08
Payer: COMMERCIAL

## 2025-07-08 VITALS
BODY MASS INDEX: 26.14 KG/M2 | SYSTOLIC BLOOD PRESSURE: 132 MMHG | DIASTOLIC BLOOD PRESSURE: 62 MMHG | HEART RATE: 75 BPM | RESPIRATION RATE: 18 BRPM | OXYGEN SATURATION: 98 % | TEMPERATURE: 98.4 F | HEIGHT: 72 IN | WEIGHT: 193 LBS

## 2025-07-08 DIAGNOSIS — E78.2 MIXED HYPERLIPIDEMIA: ICD-10-CM

## 2025-07-08 DIAGNOSIS — Z12.11 COLON CANCER SCREENING: ICD-10-CM

## 2025-07-08 DIAGNOSIS — E11.649 TYPE 2 DIABETES MELLITUS WITH HYPOGLYCEMIA WITHOUT COMA, WITH LONG-TERM CURRENT USE OF INSULIN: Primary | ICD-10-CM

## 2025-07-08 DIAGNOSIS — F41.9 ANXIETY: ICD-10-CM

## 2025-07-08 DIAGNOSIS — Z79.4 TYPE 2 DIABETES MELLITUS WITH HYPOGLYCEMIA WITHOUT COMA, WITH LONG-TERM CURRENT USE OF INSULIN: Primary | ICD-10-CM

## 2025-07-08 DIAGNOSIS — I10 PRIMARY HYPERTENSION: ICD-10-CM

## 2025-07-08 DIAGNOSIS — R79.89 ELEVATED TSH: ICD-10-CM

## 2025-07-08 PROCEDURE — 99214 OFFICE O/P EST MOD 30 MIN: CPT

## 2025-07-08 NOTE — PROGRESS NOTES
Subjective     Chief Complaint   Patient presents with    Diabetes       Diabetes  Associated symptoms:     no chest pain      History of Present Illness  The patient presents for evaluation of diabetes mellitus, mental health issues, and health maintenance.    He reports feeling more tired over the past few days. His blood sugar levels have shown slight improvement, currently around 190. He is using short-acting insulin and has requested a prescription for 32-gauge 4 mm needles. He is not experiencing any chest pain, shortness of breath, or presence of blood in urine or stool.    His mental health is showing signs of improvement as he is nearing the end of his divorce process. He reports better sleep quality and is considering resuming his medication regimen, taking one tablet in the morning.    His daughter and grandchildren have moved in with him temporarily due to issues with their rental property. He is due for a colonoscopy but has been postponing it.    SOCIAL HISTORY  He works as a supervisor at .    Patient's PMR from outside medical facility reviewed and noted.    Review of Systems   HENT:  Negative for congestion.    Respiratory:  Negative for shortness of breath.    Cardiovascular:  Negative for chest pain and leg swelling.   Gastrointestinal:  Negative for blood in stool and constipation.   Genitourinary:  Negative for hematuria.        Otherwise complete ROS reviewed and negative except as mentioned in the HPI.    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 He has never been exposed to tobacco smoke. He has never used smokeless tobacco. He reports current alcohol use. He reports that he does  "not use drugs.    Family History: family history includes Alzheimer's disease in his father; Anxiety disorder in his mother; Depression in his mother; Diabetes in his mother, sister, and sister; Hyperlipidemia in his mother; Hypertension in his mother.      Allergies:  Allergies   Allergen Reactions    Penicillins GI Intolerance     Medications:  Prior to Admission medications    Medication Sig Start Date End Date Taking? Authorizing Provider   buPROPion XL (Wellbutrin XL) 300 MG 24 hr tablet Take 1 tablet by mouth Daily. 4/8/25  Yes July Johnson APRN   busPIRone (BUSPAR) 10 MG tablet Take 1 tablet by mouth 2 (Two) Times a Day. 4/8/25  Yes July Johnson APRN   Continuous Glucose Sensor (Dexcom G6 Sensor) Use Every 10 (Ten) Days. 7/9/24  Yes Jefry Blackman MD   Continuous Glucose Transmitter (Dexcom G6 Transmitter) misc Use 1 each Daily. 7/9/24  Yes Jefry Blackman MD   glucose blood test strip Use as instructed 1/9/25  Yes July Johnson APRN   Insulin Glargine-yfgn (SEMGLEE-YFGN) 100 UNIT/ML Inject 60 Units under the skin into the appropriate area as directed Daily. 1/14/25  Yes July Johnson APRN   Insulin Lispro (HumaLOG) 100 UNIT/ML injection Inject 8 Units under the skin into the appropriate area as directed 3 (Three) Times a Day Before Meals. 4/8/25  Yes July Johnson APRN   Insulin Pen Needle 32G X 4 MM misc Use 1 each 4 (Four) Times a Day. 4/8/25  Yes July Johnson APRN   Insulin Syringe (B-D INSULIN SYRINGE) 29G X 1/2\" 1 ML misc Use 1 each 4 (Four) Times a Day. 1/14/25  Yes July Johnson APRN   lisinopril (PRINIVIL,ZESTRIL) 40 MG tablet Take 1 tablet by mouth Daily. 6/6/25  Yes Ange Norwood APRN   metFORMIN (GLUCOPHAGE) 1000 MG tablet Take 1 tablet by mouth 2 (Two) Times a Day With Meals. 3/18/25  Yes Alex, July, APRN   Omega-3 Fatty Acids (fish oil) 1000 MG capsule capsule Take 2 capsules by mouth Daily With Breakfast. 6/16/23  Yes Alex, " RADHA Mcdowell   omeprazole (priLOSEC) 20 MG capsule Take 1 capsule by mouth Daily. 6/16/23  Yes July Johnson APRN   OneTouch Delica Lancets 30G misc Use 100 each 3 times a day. 1/9/25  Yes July Johnson APRN   sildenafil (REVATIO) 20 MG tablet Take 1 tablet by mouth Daily. 10/8/24  Yes July Johnson APRN   SUMAtriptan (Imitrex) 50 MG tablet Take one tablet at onset of headache. May repeat dose one time in 2 hours if headache not relieved. 2/23/24  Yes Devin De La Cruz APRN   traZODone (DESYREL) 50 MG tablet Take 1 tablet by mouth Every Night. 4/8/25  Yes July Johnson APRN   triamcinolone (KENALOG) 0.1 % cream Apply  topically to the appropriate area as directed 2 (Two) Times a Day. 4/30/25  Yes July Johnson APRN   vitamin D (ERGOCALCIFEROL) 1.25 MG (27943 UT) capsule capsule Take 1 capsule by mouth 1 (One) Time Per Week. 1/25/24  Yes Devin De La Cruz APRN       EVELYNE:        PHQ-9 Depression Screening  Little interest or pleasure in doing things? Not at all   Feeling down, depressed, or hopeless? Several days   PHQ-2 Total Score 1   Trouble falling or staying asleep, or sleeping too much?     Feeling tired or having little energy?     Poor appetite or overeating?     Feeling bad about yourself - or that you are a failure or have let yourself or your family down?     Trouble concentrating on things, such as reading the newspaper or watching television?     Moving or speaking so slowly that other people could have noticed? Or the opposite - being so fidgety or restless that you have been moving around a lot more than usual?     Thoughts that you would be better off dead, or of hurting yourself in some way?     PHQ-9 Total Score     If you checked off any problems, how difficult have these problems made it for you to do your work, take care of things at home, or get along with other people? Somewhat difficult       PHQ-9 Total Score:     1-4 (Minimal Depression)  Support given, observe for  "worsening symptoms    Objective     Vital Signs: /62   Pulse 75   Temp 98.4 °F (36.9 °C) (Skin)   Resp 18   Ht 182.9 cm (72\")   Wt 87.5 kg (193 lb)   SpO2 98%   BMI 26.18 kg/m²   Physical Exam  Vitals and nursing note reviewed.   Constitutional:       Appearance: Normal appearance.   HENT:      Head: Normocephalic.      Right Ear: External ear normal.      Left Ear: External ear normal.      Nose: Nose normal.      Mouth/Throat:      Mouth: Mucous membranes are moist.   Eyes:      General: No scleral icterus.  Cardiovascular:      Rate and Rhythm: Normal rate and regular rhythm.      Pulses: Normal pulses.      Heart sounds: Normal heart sounds.   Pulmonary:      Effort: Pulmonary effort is normal.      Breath sounds: Normal breath sounds.   Musculoskeletal:         General: Normal range of motion.   Skin:     General: Skin is warm.   Neurological:      General: No focal deficit present.      Mental Status: He is alert and oriented to person, place, and time.   Psychiatric:         Mood and Affect: Mood normal.         Behavior: Behavior normal.         Thought Content: Thought content normal.         Judgment: Judgment normal.                Advance Care Planning   ACP discussion was held with the patient during this visit.         Results Reviewed:  Glucose   Date Value Ref Range Status   04/08/2025 222 (H) 70 - 99 mg/dL Final     BUN   Date Value Ref Range Status   04/08/2025 16 6 - 24 mg/dL Final     Creatinine   Date Value Ref Range Status   04/08/2025 1.04 0.76 - 1.27 mg/dL Final     Sodium   Date Value Ref Range Status   04/08/2025 138 134 - 144 mmol/L Final     Potassium   Date Value Ref Range Status   04/08/2025 4.7 3.5 - 5.2 mmol/L Final     Chloride   Date Value Ref Range Status   04/08/2025 102 96 - 106 mmol/L Final     Total CO2   Date Value Ref Range Status   04/08/2025 24 20 - 29 mmol/L Final     Calcium   Date Value Ref Range Status   04/08/2025 9.0 8.7 - 10.2 mg/dL Final     ALT (SGPT) "   Date Value Ref Range Status   04/08/2025 16 0 - 44 IU/L Final     AST (SGOT)   Date Value Ref Range Status   04/08/2025 14 0 - 40 IU/L Final     WBC   Date Value Ref Range Status   04/08/2025 6.0 3.4 - 10.8 x10E3/uL Final     Hematocrit   Date Value Ref Range Status   04/08/2025 42.8 37.5 - 51.0 % Final     Platelets   Date Value Ref Range Status   04/08/2025 281 150 - 450 x10E3/uL Final     Triglycerides   Date Value Ref Range Status   04/08/2025 76 0 - 149 mg/dL Final     HDL Cholesterol   Date Value Ref Range Status   04/08/2025 43 >39 mg/dL Final     LDL Chol Calc (NIH)   Date Value Ref Range Status   04/08/2025 92 0 - 99 mg/dL Final     Hemoglobin A1C   Date Value Ref Range Status   04/08/2025 8.6 (H) 4.8 - 5.6 % Final     Comment:              Prediabetes: 5.7 - 6.4           Diabetes: >6.4           Glycemic control for adults with diabetes: <7.0     01/03/2025 7.5 (A) 4.5 - 5.7 % Final         Assessment / Plan     Assessment/Plan:    1. Type 2 diabetes mellitus with hypoglycemia without coma, with long-term current use of insulin  - Hemoglobin A1c    2. Anxiety    3. Primary hypertension  - CBC & Differential  - Comprehensive metabolic panel    4. Colon cancer screening  - Ambulatory Referral For Screening Colonoscopy    5. Mixed hyperlipidemia  - Lipid panel    6. Elevated TSH  - T4  - TSH    Diagnoses and all orders for this visit:    1. Type 2 diabetes mellitus with hypoglycemia without coma, with long-term current use of insulin (Primary)  -     Hemoglobin A1c    2. Anxiety    3. Primary hypertension  -     CBC & Differential  -     Comprehensive metabolic panel    4. Colon cancer screening  -     Ambulatory Referral For Screening Colonoscopy    5. Mixed hyperlipidemia  -     Lipid panel    6. Elevated TSH  -     T4  -     TSH        Assessment & Plan  1. Diabetes Mellitus.  - Blood sugar levels have shown some improvement but are still not optimal, with readings around 190 mg/dL.  - Currently taking  short-acting insulin, which has been beneficial.  - Prescription for 32-gauge 4 mm insulin needles was sent to pharmacy in April.  - Blood work will be conducted today to monitor his condition. The reason for the insurance refusal of Mounjaro and Ozempic will be investigated, and he will be informed accordingly.    2. Mental Health Issues.  - Reports that his mental health has been improving, especially with the ongoing resolution of his divorce.  - Physical exam findings indicate better sleep.  - Advised to continue taking his medication as needed, starting with one tablet in the morning.  - Monitor symptoms and adjust the dosage if necessary.    3. Health Maintenance.  - Due for a colonoscopy, which has been ordered.  - Will receive a call to schedule the procedure.  - Encouraged not to delay this important screening.    No follow-ups on file. unless patient needs to be seen sooner or acute issues arise.      I have discussed the patient results/orders and and plan/recommendation with them at today's visit.    Patient or patient representative verbalized consent for the use of Ambient Listening during the visit with  RADHA Mccann for chart documentation. 7/8/2025  07:37 CDT  RADHA Mccann   07/08/2025

## 2025-07-09 LAB
ALBUMIN SERPL-MCNC: 3.9 G/DL (ref 3.8–4.9)
ALP SERPL-CCNC: 90 IU/L (ref 44–121)
ALT SERPL-CCNC: 29 IU/L (ref 0–44)
AST SERPL-CCNC: 19 IU/L (ref 0–40)
BASOPHILS # BLD AUTO: 0 X10E3/UL (ref 0–0.2)
BASOPHILS NFR BLD AUTO: 1 %
BILIRUB SERPL-MCNC: 0.3 MG/DL (ref 0–1.2)
BUN SERPL-MCNC: 15 MG/DL (ref 6–24)
BUN/CREAT SERPL: 15 (ref 9–20)
CALCIUM SERPL-MCNC: 9.2 MG/DL (ref 8.7–10.2)
CHLORIDE SERPL-SCNC: 102 MMOL/L (ref 96–106)
CHOLEST SERPL-MCNC: 151 MG/DL (ref 100–199)
CO2 SERPL-SCNC: 22 MMOL/L (ref 20–29)
CREAT SERPL-MCNC: 0.99 MG/DL (ref 0.76–1.27)
EGFRCR SERPLBLD CKD-EPI 2021: 90 ML/MIN/1.73
EOSINOPHIL # BLD AUTO: 0.6 X10E3/UL (ref 0–0.4)
EOSINOPHIL NFR BLD AUTO: 11 %
ERYTHROCYTE [DISTWIDTH] IN BLOOD BY AUTOMATED COUNT: 12 % (ref 11.6–15.4)
GLOBULIN SER CALC-MCNC: 1.9 G/DL (ref 1.5–4.5)
GLUCOSE SERPL-MCNC: 229 MG/DL (ref 70–99)
HBA1C MFR BLD: 7.8 % (ref 4.8–5.6)
HCT VFR BLD AUTO: 45.4 % (ref 37.5–51)
HDLC SERPL-MCNC: 40 MG/DL
HGB BLD-MCNC: 15 G/DL (ref 13–17.7)
IMM GRANULOCYTES # BLD AUTO: 0 X10E3/UL (ref 0–0.1)
IMM GRANULOCYTES NFR BLD AUTO: 0 %
LDLC SERPL CALC-MCNC: 91 MG/DL (ref 0–99)
LYMPHOCYTES # BLD AUTO: 2 X10E3/UL (ref 0.7–3.1)
LYMPHOCYTES NFR BLD AUTO: 35 %
MCH RBC QN AUTO: 31 PG (ref 26.6–33)
MCHC RBC AUTO-ENTMCNC: 33 G/DL (ref 31.5–35.7)
MCV RBC AUTO: 94 FL (ref 79–97)
MONOCYTES # BLD AUTO: 0.4 X10E3/UL (ref 0.1–0.9)
MONOCYTES NFR BLD AUTO: 8 %
NEUTROPHILS # BLD AUTO: 2.7 X10E3/UL (ref 1.4–7)
NEUTROPHILS NFR BLD AUTO: 45 %
PLATELET # BLD AUTO: 307 X10E3/UL (ref 150–450)
POTASSIUM SERPL-SCNC: 4.7 MMOL/L (ref 3.5–5.2)
PROT SERPL-MCNC: 5.8 G/DL (ref 6–8.5)
RBC # BLD AUTO: 4.84 X10E6/UL (ref 4.14–5.8)
SODIUM SERPL-SCNC: 139 MMOL/L (ref 134–144)
T4 SERPL-MCNC: 5.9 UG/DL (ref 4.5–12)
TRIGL SERPL-MCNC: 111 MG/DL (ref 0–149)
TSH SERPL DL<=0.005 MIU/L-ACNC: 3.21 UIU/ML (ref 0.45–4.5)
VLDLC SERPL CALC-MCNC: 20 MG/DL (ref 5–40)
WBC # BLD AUTO: 5.9 X10E3/UL (ref 3.4–10.8)

## 2025-07-30 DIAGNOSIS — I10 HYPERTENSION, UNSPECIFIED TYPE: ICD-10-CM

## 2025-07-30 RX ORDER — LISINOPRIL 40 MG/1
40 TABLET ORAL DAILY
Qty: 60 TABLET | Refills: 5 | Status: SHIPPED | OUTPATIENT
Start: 2025-07-30

## 2025-08-26 ENCOUNTER — OFFICE VISIT (OUTPATIENT)
Dept: INTERNAL MEDICINE | Facility: CLINIC | Age: 55
End: 2025-08-26
Payer: COMMERCIAL

## 2025-08-26 VITALS
HEIGHT: 72 IN | HEART RATE: 89 BPM | WEIGHT: 188 LBS | DIASTOLIC BLOOD PRESSURE: 62 MMHG | BODY MASS INDEX: 25.47 KG/M2 | OXYGEN SATURATION: 96 % | SYSTOLIC BLOOD PRESSURE: 132 MMHG | RESPIRATION RATE: 17 BRPM | TEMPERATURE: 98.4 F

## 2025-08-26 DIAGNOSIS — E11.649 TYPE 2 DIABETES MELLITUS WITH HYPOGLYCEMIA WITHOUT COMA, WITH LONG-TERM CURRENT USE OF INSULIN: ICD-10-CM

## 2025-08-26 DIAGNOSIS — Z12.11 COLON CANCER SCREENING: ICD-10-CM

## 2025-08-26 DIAGNOSIS — Z79.4 TYPE 2 DIABETES MELLITUS WITH HYPOGLYCEMIA WITHOUT COMA, WITH LONG-TERM CURRENT USE OF INSULIN: ICD-10-CM

## 2025-08-26 DIAGNOSIS — L98.9 SKIN LESION OF FOOT: Primary | ICD-10-CM
